# Patient Record
Sex: MALE | Race: WHITE | NOT HISPANIC OR LATINO | Employment: OTHER | URBAN - METROPOLITAN AREA
[De-identification: names, ages, dates, MRNs, and addresses within clinical notes are randomized per-mention and may not be internally consistent; named-entity substitution may affect disease eponyms.]

---

## 2017-02-13 ENCOUNTER — GENERIC CONVERSION - ENCOUNTER (OUTPATIENT)
Dept: OTHER | Facility: OTHER | Age: 67
End: 2017-02-13

## 2017-03-24 ENCOUNTER — LAB CONVERSION - ENCOUNTER (OUTPATIENT)
Dept: OTHER | Facility: OTHER | Age: 67
End: 2017-03-24

## 2017-03-24 LAB
ALBUMIN SERPL BCP-MCNC: 3.9 G/DL (ref 3.6–5.1)
ALP SERPL-CCNC: 61 U/L (ref 40–115)
AST SERPL W P-5'-P-CCNC: 20 U/L (ref 10–35)
BASOPHILS # BLD AUTO: 0.3 10*3/UL
BASOPHILS # BLD AUTO: 12 10*3/UL (ref 0–200)
BILIRUB SERPL-MCNC: 0.7 MG/DL (ref 0.2–1.2)
BUN SERPL-MCNC: 14 MG/DL (ref 7–25)
CALCIUM SERPL-MCNC: 9.3 MG/DL (ref 8.6–10.3)
CHLORIDE SERPL-SCNC: 107 MMOL/L (ref 98–110)
CHOLEST SERPL-MCNC: 191 MG/DL (ref 125–200)
CHOLEST/HDLC SERPL: 2.8 {RATIO}
CO2 SERPL-SCNC: 26 MMOL/L (ref 20–31)
CREAT SERPL-MCNC: 0.95 MG/DL (ref 0.7–1.25)
DEPRECATED RDW RBC AUTO: 13.6 FL (ref 11–15)
EGFR AFRICAN AMERICAN (HISTORICAL): 97
EGFR-AMERICAN CALC (HISTORICAL): 84
EOSINOPHIL # BLD AUTO: 124 10*3/UL (ref 15–500)
EOSINOPHIL NFR BLD AUTO: 3.1 %
GLUCOSE SERPL-MCNC: 84 MG/DL (ref 65–99)
HCT VFR BLD AUTO: 44.4 % (ref 38.5–50)
HDLC SERPL-MCNC: 69 MG/DL
HGB BLD-MCNC: 14.6 G/DL (ref 13.2–17.1)
LDLC SERPL CALC-MCNC: 108 MG/DL
LYMPHOCYTES # BLD AUTO: 1712 10*3/UL (ref 850–3900)
LYMPHOCYTES NFR BLD AUTO: 42.8 %
MCH RBC QN AUTO: 30 PG (ref 27–33)
MCHC RBC AUTO-ENTMCNC: 33 G/DL (ref 32–0.36)
MCV RBC AUTO: 90.9 FL (ref 80–100)
MONOCYTES # BLD AUTO: 468 10*3/UL (ref 200–950)
MONOCYTES NFR BLD AUTO: 11.7 %
NEUTROPHILS # BLD AUTO: 1684 10*3/UL (ref 1500–7800)
NEUTS SEG NFR BLD AUTO: 42.1 %
PLATELET # BLD AUTO: 191 10*3/UL (ref 140–400)
PMV BLD AUTO: 9.2 FL (ref 7.5–12.5)
POTASSIUM SERPL-SCNC: 5.1 MMOL/L (ref 3.5–5.3)
RBC # BLD AUTO: 4.88 10*6/UL (ref 4.2–5.8)
SODIUM SERPL-SCNC: 141 MMOL/L (ref 135–146)
TOTAL PROTEIN (HISTORICAL): 6.4 (ref 6.1–8.1)
TRIGL SERPL-MCNC: 68 MG/DL
VLDLC SERPL CALC-MCNC: 14 MG/DL
WBC # BLD AUTO: 4 10*3/UL (ref 3.8–10.8)

## 2017-04-12 ENCOUNTER — GENERIC CONVERSION - ENCOUNTER (OUTPATIENT)
Dept: OTHER | Facility: OTHER | Age: 67
End: 2017-04-12

## 2017-04-28 ENCOUNTER — GENERIC CONVERSION - ENCOUNTER (OUTPATIENT)
Dept: OTHER | Facility: OTHER | Age: 67
End: 2017-04-28

## 2017-07-11 ENCOUNTER — GENERIC CONVERSION - ENCOUNTER (OUTPATIENT)
Dept: OTHER | Facility: OTHER | Age: 67
End: 2017-07-11

## 2017-07-21 ENCOUNTER — GENERIC CONVERSION - ENCOUNTER (OUTPATIENT)
Dept: OTHER | Facility: OTHER | Age: 67
End: 2017-07-21

## 2017-11-28 DIAGNOSIS — Z12.5 ENCOUNTER FOR SCREENING FOR MALIGNANT NEOPLASM OF PROSTATE: ICD-10-CM

## 2017-12-12 ENCOUNTER — ALLSCRIPTS OFFICE VISIT (OUTPATIENT)
Dept: OTHER | Facility: OTHER | Age: 67
End: 2017-12-12

## 2018-01-23 VITALS
HEART RATE: 89 BPM | OXYGEN SATURATION: 96 % | HEIGHT: 69 IN | WEIGHT: 186.38 LBS | TEMPERATURE: 98.4 F | BODY MASS INDEX: 27.6 KG/M2

## 2018-01-23 NOTE — PROGRESS NOTES
Assessment    1  Encounter for preventive health examination (V70 0) (Z00 00)   2  Anxiety (300 00) (F41 9)   3  Generalized osteoarthritis (715 00) (M15 9)   4  Acoustic neuroma (225 1) (D33 3)    Plan  Advanced care planning/counseling discussion    · We recommend that you create an advance directive ; Status:Complete;   Done:  68TXQ3994  Anxiety    · ALPRAZolam 0 5 MG Oral Tablet; take 1 tablet daily as needed  Screening for depression    · *VB-Depression Screening; Status:Complete;   Done: 70JNK7942 12:00AM  Screening for genitourinary condition    · *VB - Urinary Incontinence Screen (Dx Z13 89 Screen for UI); Status:Complete;   Done:  96UDR0367 12:00AM  Screening for neurological condition    · *VB - Fall Risk Assessment  (Dx Z13 89 Screen for Neurologic Disorder);  Status:Complete;   Done: 14FBV2787 12:00AM    Discussion/Summary  health maintenance visit Impression: healthy adult male  eats an adequate diet Currently, he has an adequate exercise regimen  Prostate cancer screening: PSA was ordered and will get w labs at [facility] in march  Testicular cancer screening: monthly self testicular exam was advised  Colorectal cancer screening: colorectal cancer screening is current  The patient declines immunizations  Advice and education were given regarding weight bearing exercise and weight loss  Patient discussion: discussed with the patient  Pt gets labs in spring thru the [facility]  Exercise daily  Refill alprazolam, pt tries not to take regularly  Recommended prevnar, pt will consider  Rto 6 months  Possible side effects of new medications were reviewed with the patient/guardian today  The treatment plan was reviewed with the patient/guardian  The patient/guardian understands and agrees with the treatment plan         Self Referrals: No      Chief Complaint  Pt presents today for a health maintenance visit  No new medications, pt is requesting alprazolam refill  No new allergies reported   Pt states that he underwent an outpatient radiation procedure on August 31st-does not appear to be in chart  Performed by Dr Arpit Barrett  Pt has not had a pneumo vacc as of today  No issues with sleep or appetite  No falls in the past year  History of Present Illness  HM, Adult Male: The patient is being seen for a health maintenance evaluation  The last health maintenance visit was 1 yr year(s) ago  General Health: The patient's health since the last visit is described as good  He has regular dental visits  He denies vision problems  He has hearing loss  Immunizations status: not up to date   pt had procedure for AN  Lifestyle:  He consumes a diverse and healthy diet  He does not have any weight concerns  He exercises regularly  He does not use tobacco  He denies alcohol use  He denies drug use  Reproductive health:  the patient is sexually active  birth control is not being practiced  He complains of erectile dysfunction  Screening: cancer screening reviewed and current  metabolic screening reviewed and current  risk screening reviewed and current  HPI: Pt had gamma knife for acoustic neuroma, thinks sxs have lessened  No headache or vision change  No cp  Walks for exercise  Mri anette 9 months      Review of Systems    Constitutional: no fever and not feeling tired  Eyes: No complaints of eye pain, no red eyes, no discharge from eyes, no itchy eyes  ENT: no complaints of earache, no hearing loss, no nosebleeds, no nasal discharge, no sore throat, no hoarseness  Cardiovascular: No complaints of slow heart rate, no fast heart rate, no chest pain, no palpitations, no leg claudication, no lower extremity  Respiratory: shortness of breath during exertion  Gastrointestinal: No complaints of abdominal pain, no constipation, no nausea or vomiting, no diarrhea or bloody stools  Genitourinary: No complaints of dysuria, no incontinence, no hesitancy, no nocturia, no genital lesion, no testicular pain  Musculoskeletal: arthralgias and myalgias  Integumentary: No complaints of skin rash or skin lesions, no itching, no skin wound, no dry skin  Neurological: no difficulty walking  Psychiatric: Is not suicidal, no sleep disturbances, no anxiety or depression, no change in personality, no emotional problems  Endocrine: No complaints of proptosis, no hot flashes, no muscle weakness, no erectile dysfunction, no deepening of the voice, no feelings of weakness  Hematologic/Lymphatic: No complaints of swollen glands, no swollen glands in the neck, does not bleed easily, no easy bruising  Active Problems    1  Advanced care planning/counseling discussion (V65 49) (Z71 89)   2  Anxiety (300 00) (F41 9)   3  Elevated prostate specific antigen (PSA) (790 93) (R97 20)   4  Encounter for special screening examination for neoplasm of prostate (V76 44) (Z12 5)   5  Generalized osteoarthritis (715 00) (M15 9)   6  Hearing loss (389 9) (H91 90)   7  Hyperlipidemia (272 4) (E78 5)   8  Need for influenza vaccination (V04 81) (Z23)    Surgical History    · History of Ankle Surgery   · History of Oral Surgery Tooth Extraction [place] Tooth   · History of Wrist Surgery    Family History  Mother    · Family history of Alzheimer's dementia    Social History    · Denied: Alcohol use   · Always uses seat belt   · Caffeine use (V49 89) (F15 90)   · Denied: Drug use (305 90) (F19 90)   · Never a smoker   · Nonsmoker (V49 89) (Z78 9)   · Denied: Tobacco use (305 1) (Z72 0)    Current Meds   1  Aleve 220 MG Oral Capsule; Therapy: 20OUR6553 to Recorded   2  ALPRAZolam 0 5 MG Oral Tablet; one po daily prn; Therapy: 71Gku9690 to (Last Rx:65Djx7598) Ordered   3  Multi-Vitamin Oral Tablet; TAKE 1 TABLET DAILY; Therapy: 71ZLL5146 to (Evaluate:09Jan2015); Last Rx:38Itp9956 Ordered   4  Simvastatin 10 MG Oral Tablet; Take 1 tablet daily;    Therapy: 11NOH5921 to (Iqra Amos)  Requested for: 81Woe2800; Last   Rx:71Yps6251 Ordered   5  Vitamin B-Complex Oral Tablet; TAKE 1 TABLET DAILY; Therapy: 95WHG4399 to (Evaluate:09Jan2015)  Requested for: 66GPO6176; Last   Rx:75Cro1309 Ordered   6  Zyclara Pump 3 75 % External Cream;   Therapy: 95BZP9231 to Recorded    Allergies    1  Neomycin Sulfate TABS   2  Polymyxin B Sulfate SOLR    Vitals   Recorded: 12Gfy1830 12:52PM   Temperature 98 4 F   Heart Rate 89   Height 5 ft 9 in   Weight 186 lb 6 oz   BMI Calculated 27 52   BSA Calculated 2   O2 Saturation 96     Physical Exam    Constitutional   General appearance: No acute distress, well appearing and well nourished  no distress  Head and Face   Head and face: Normal     Palpation of the face and sinuses: No sinus tenderness  Eyes   Conjunctiva and lids: No erythema, swelling or discharge  Pupils and irises: Equal, round, reactive to light  Ophthalmoscopic examination: Normal fundi and optic discs  Ears, Nose, Mouth, and Throat   External inspection of ears and nose: Normal     Otoscopic examination: Tympanic membranes translucent with normal light reflex  Canals patent without erythema  Hearing: Normal     Nasal mucosa, septum, and turbinates: Normal without edema or erythema  Lips, teeth, and gums: Normal, good dentition  Oropharynx: Normal with no erythema, edema, exudate or lesions  Neck   Neck: Supple, symmetric, trachea midline, no masses  Thyroid: Normal, no thyromegaly  Pulmonary   Respiratory effort: No increased work of breathing or signs of respiratory distress  Percussion of chest: Normal     Palpation of chest: Normal     Auscultation of lungs: Clear to auscultation  Cardiovascular   Palpation of heart: Normal PMI, no thrills  Auscultation of heart: Normal rate and rhythm, normal S1 and S2, no murmurs  Carotid pulses: 2+ bilaterally  Abdominal aorta: Normal     Femoral pulses: 2+ bilaterally  Pedal pulses: 2+ bilaterally      Peripheral vascular exam: Normal     Examination of extremities for edema and/or varicosities: Normal     Abdomen   Abdomen: Non-tender, no masses  Liver and spleen: No hepatomegaly or splenomegaly  Examination for hernias: No hernias appreciated  Anus, perineum, and rectum: Normal sphincter tone, no masses, no prolapse  Stool sample for occult blood: Negative  Genitourinary   Scrotal contents: Normal testes, no masses  Penis: Normal, no lesions  Lymphatic   Palpation of lymph nodes in neck: No lymphadenopathy  Palpation of lymph nodes in axillae: No lymphadenopathy  Palpation of lymph nodes in groin: No lymphadenopathy  Palpation of lymph nodes in other areas: No lymphadenopathy  Musculoskeletal   Gait and station: Normal     Inspection/palpation of digits and nails: Normal without clubbing or cyanosis  Inspection/palpation of joints, bones, and muscles: Normal     Range of motion: Normal     Stability: Normal     Muscle strength/tone: Normal     Skin   Skin and subcutaneous tissue: Normal without rashes or lesions  Palpation of skin and subcutaneous tissue: Normal turgor  Neurologic   Cranial nerves: Cranial nerves 2-12 intact  Cortical function: Normal mental status  Reflexes: 2+ and symmetric  Sensation: No sensory loss  Coordination: Normal finger to nose and heel to shin  Psychiatric   Judgment and insight: Normal     Orientation to person, place and time: Normal     Recent and remote memory: Intact      Mood and affect: Normal        Results/Data  *VB-Depression Screening 68Qqn7929 12:00AM [employer]     Test Name Result Flag Reference   Depression Scale Result      Depression Screen - Negative For Symptoms     *VB - Fall Risk Assessment  (Dx Z13 89 Screen for Neurologic Disorder) 03Owx4275 12:00AM [employer]     Test Name Result Flag Reference   Falls Risk      No falls in the past year     *VB - Urinary Incontinence Screen (Dx Z13 89 Screen for UI) 05QDJ3992 12:00AM [employer] Test Name Result Flag Reference   Urinary Incontinence Assessment 29VNR9923         Future Appointments    Date/Time Provider Specialty Site   06/19/2018 01:15 PM Pati Bains DO Internal Medicine [facility] INTERNAL MEDICINE [facility]     Signatures   Electronically signed by : Umer Dougherty DO; Dec 12 2017  8:41PM EST                       (Author)

## 2018-01-24 NOTE — PROGRESS NOTES
Assessment   1  Advanced care planning/counseling discussion (V65 49) (Z71 89)  2  Never a smoker  3  Encounter for preventive health examination (V70 0) (Z00 00)    Plan  Anxiety    · Start: ALPRAZolam 0 5 MG Oral Tablet; one po daily prn   · *VB - PHQ-9 Tool; Status:Complete;   Done: 57HTK2873 08:18AM   · *VB-Depression Screening; Status:Complete;   Done: 66EKU4986 08:17AM  Anxiety, Generalized osteoarthritis    · Follow-up visit in 6 months Evaluation and Treatment  Follow-up  Status: Hold For -  Scheduling  Requested for: 95Kki4338  Health Maintenance    · *VB - Fall Risk Assessment  (Dx Z13 89 Screen for Neurologic Disorder);  Status:Complete;   Done: 76ZUZ4315 08:17AM   · *VB - Urinary Incontinence Screen (Dx Z13 89 Screen for UI); Status:Complete;   Done:  06MTR8656 08:17AM    Discussion/Summary  Impression:  health maintenance visit1  ,  healthy adult male1  1   Currently, he  eats an adequate diet1  and  has an adequate exercise regimen1  1   Prostate cancer screening:  prostate cancer screening is current1  1   Testicular cancer screening:  monthly self testicular exam was advised1  1   Colorectal cancer screening:  colorectal cancer screening is current1  1   The  immunizations are up to date1  and  pt only agrees to annual flu shot and that is up to date1  1   Advice and education were given regarding  weight bearing exercise1  ,  calcium supplements1  and  vitamin D supplements1  1   Encouraged exercise and even silver sneakers program  Pt deferred prevnar and zostavax info  Increase water intake  Offered urology f/u and gave sample of viagra  1    The patient has the current Goals:1  Decrease anxiety1  The patent has the current Barriers:1  Lack of a hobby and outside interests   gets down at times but brandan with it and is trying to find other interests1    Patient is able to Self-Care1    Possible side effects of new medications were reviewed with the patient/guardian today1  The treatment plan was reviewed with the patient/guardian  The patient/guardian understands and agrees with the treatment plan1      Self Referrals: No       1 Amended By: Governor Fisher; Dec 29 2016 10:20 AM EST    Chief Complaint  pt is being seen today for an annual wellness visit  pt has no complaints or concerns  pt states he would like to talk about the pneumovax  pt was given five wishes form  pt would/is due for refill on xanax  Advance Directives  Advance Directive [facility]:   NO - Patient does not have an advance health care directive  given packet during appt  History of Present Illness  HM, Adult Male: The patient is being seen for a  health maintenance1  evaluation1   The last health maintenance visit was1  1 year year(s) ago1   General Health: The patient's health since the last visit is described as1  good1   He does not have regular dental visits1   He denies vision problems1   He denies hearing loss1   Immunizations status:1  not up to date1    Gets annual flu shot,deferred others1   Lifestyle:1   He consumes a diverse and healthy diet1   He does not have any weight concerns1   He does not exercise regularly1   He does not use tobacco1   He denies alcohol use1   He denies drug use1   Reproductive health:1   The patient is not sexually active1   Birth control is not being practiced1   He complains of erectile dysfunction1   Pt expresses frustration that he and his wife are no longer sexually active despite his desire for such1   Screening: Cancer screening reviewed and current1   Metabolic screening reviewed and current1   Risk screening reviewed and current1   HPI: Pt doing ok  No new sxs  He is looking for a hobby or something to occupy time dureing the day other than TV   He does walk on occasion but not regularly1        1 Amended By: Governor Fisher; Dec 29 2016 10:12 AM EST    Review of Systems    Constitutional:1  No fever or chills, feels well, no tiredness, no recent weight gain or weight loss1   Eyes:1  new eyeglasses1 , but No complaints of eye pain, no red eyes, no discharge from eyes, no itchy eyes1  and no eyesight problems1   ENT:1  no complaints of earache, no hearing loss, no nosebleeds, no nasal discharge, no sore throat, no hoarseness1   Cardiovascular: 1  No complaints of slow heart rate, no fast heart rate, no chest pain, no palpitations, no leg claudication, no lower extremity1   Respiratory:1  No complaints of shortness of breath, no wheezing, no cough, no SOB on exertion, no orthopnea or PND1   Gastrointestinal:1  No complaints of abdominal pain, no constipation, no nausea or vomiting, no diarrhea or bloody stools1   Genitourinary:1  No complaints of dysuria, no incontinence, no hesitancy, no nocturia, no genital lesion, no testicular pain1   Musculoskeletal:1  arthralgias1   Integumentary:1  No complaints of skin rash or skin lesions, no itching, no skin wound, no dry skin1   Neurological:1  No compliants of headache, no confusion, no convulsions, no numbness or tingling, no dizziness or fainting, no limb weakness, no difficulty walking1   Psychiatric:1  anxiety1   Endocrine:1  No complaints of proptosis, no hot flashes, no muscle weakness, no erectile dysfunction, no deepening of the voice, no feelings of weakness1   Hematologic/Lymphatic:1  No complaints of swollen glands, no swollen glands in the neck, does not bleed easily, no easy bruising1   Over the past 2 weeks, how often have you been bothered by the following problems? 1 ) Little interest or pleasure in doing things? 1  Several days1     2 ) Feeling down, depressed or hopeless? 1  Several days1     3 ) Trouble falling asleep or sleeping too much? 1  Several days1     4 ) Feeling tired or having little energy? 1  Several days1     5 ) Poor appetite or overeating? 1  Not at all1     6 ) Feeling bad about yourself, or that you are a failure, or have let yourself or your family down? 1  Several days1     7 ) Trouble concentrating on things, such as reading a newspaper or watching television? 1  Not at all1     8 ) Moving or speaking so slowly that other people could have noticed, or the opposite, moving or speaking faster than usual?1  Not at all1   severity of depression is mild1    How difficult have these problems made it for you to do your work, take care of things at home, or get along with people? 1  Not at all1   Score 51        1 Amended By: Governor Fisher; Dec 29 2016 10:13 AM EST    Active Problems   1  Advanced care planning/counseling discussion (V65 49) (Z71 89)  2  Anxiety (300 00) (F41 9)  3  Elevated prostate specific antigen (PSA) (790 93) (R97 20)  4  Generalized osteoarthritis (715 00) (M15 9)  5  Hyperlipidemia (272 4) (E78 5)    Past Medical History    · History of Acute sinusitis (461 9) (J01 90)   · History of Allergic rhinitis (477 9) (J30 9)   · History of Colon cancer screening (V76 51) (Z12 11)   · History of Cough (786 2) (R05)   · History of Feeling tired (780 79) (R53 83)   · History of Nasal congestion (478 19) (R09 81)   · History of Need for influenza vaccination (V04 81) (Z23)   · History of Need for influenza vaccination (V04 81) (Z23)   · History of Pain (780 96) (R52)    Surgical History    · History of Ankle Surgery   · History of Oral Surgery Tooth Extraction [place] Tooth   · History of Wrist Surgery    Family History  Mother    · Family history of Alzheimer's dementia    Social History    · Denied: Alcohol use   · Always uses seat belt   · Caffeine use (V49 89) (F15 90)   · Denied: Drug use (305 90) (F19 90)   · Never a smoker   · Nonsmoker (V49 89) (Z78 9)   · Denied: Tobacco use (305 1) (Z72 0)    Current Meds  1  Aleve 220 MG Oral Capsule; Therapy: 81BMN7827 to Recorded  2  ALPRAZolam 0 5 MG Oral Tablet; Take 1 daily; Therapy: 08COP0083 to (Evaluate:11Kte7837); Last Rx:67Zgm5658 Ordered  3  Multi-Vitamin Oral Tablet; TAKE 1 TABLET DAILY;    Therapy: 57IBN3110 to (RJTNDSTB:57OWN5050); Last Rx:96Cam7585 Ordered  4  Simvastatin 10 MG Oral Tablet; Take 1 tablet daily; Therapy: 89OOZ0402 to (Evaluate:03Aug2017)  Requested for: 54Gap1245; Last   Rx:06Vkh2952 Ordered  5  Vitamin B-Complex Oral Tablet; TAKE 1 TABLET DAILY; Therapy: 96FJA2959 to (Evaluate:09Jan2015)  Requested for: 83VQF2099; Last   Rx:57Rdj4643 Ordered    Allergies   1  Neomycin Sulfate TABS  2  Polymyxin B Sulfate SOLR    Vitals   Recorded: 76Aii3714 08:22AM Recorded: 18Svo6156 08:08AM   Temperature  28 5 F   Systolic 910    Diastolic 78    Height  5 ft 9 in   Weight  187 lb 2 08 oz   BMI Calculated  27 63   BSA Calculated  2     Physical Exam    Constitutional1    General appearance: No acute distress, well appearing and well nourished  1  Anxious at times1   Head and Face1    Head and face: Normal 1    Palpation of the face and sinuses: No sinus tenderness  1    Eyes1    Conjunctiva and lids: No erythema, swelling or discharge  1    Pupils and irises: Equal, round, reactive to light  1    Ophthalmoscopic examination: Normal fundi and optic discs  1    Ears, Nose, Mouth, and Throat1    External inspection of ears and nose: Normal 1    Otoscopic examination: Tympanic membranes translucent with normal light reflex  Canals patent without erythema  1    Hearing: Normal 1    Nasal mucosa, septum, and turbinates: Normal without edema or erythema  1    Lips, teeth, and gums: Normal, good dentition  1    Oropharynx: Normal with no erythema, edema, exudate or lesions  1    Neck1    Neck: Supple, symmetric, trachea midline, no masses  1    Thyroid: Normal, no thyromegaly  1    Pulmonary1    Respiratory effort: No increased work of breathing or signs of respiratory distress  1    Percussion of chest: Normal 1    Palpation of chest: Normal 1    Auscultation of lungs: Clear to auscultation  1    Cardiovascular1    Palpation of heart: Normal PMI, no thrills  1    Auscultation of heart: Normal rate and rhythm, normal S1 and S2, no murmurs  1    Carotid pulses: 2+ bilaterally  1    Abdominal aorta: Normal 1    Femoral pulses: 2+ bilaterally  1    Pedal pulses: 2+ bilaterally  1    Peripheral vascular exam: Normal 1    Examination of extremities for edema and/or varicosities: Normal 1    Abdomen1    Abdomen: Non-tender, no masses  1    Liver and spleen: No hepatomegaly or splenomegaly  1    Examination for hernias: No hernias appreciated  1    Anus, perineum, and rectum: Normal sphincter tone, no masses, no prolapse  1    Stool sample for occult blood: Negative  1    Lymphatic1    Palpation of lymph nodes in neck: No lymphadenopathy  1    Palpation of lymph nodes in axillae: No lymphadenopathy  1    Palpation of lymph nodes in groin: No lymphadenopathy  1    Palpation of lymph nodes in other areas: No lymphadenopathy  1    Musculoskeletal1    Gait and station: Normal 1    Inspection/palpation of digits and nails: Normal without clubbing or cyanosis  1    Inspection/palpation of joints, bones, and muscles: Normal 1    Range of motion: Normal 1    Stability: Normal 1    Muscle strength/tone: Normal 1    Skin1    Skin and subcutaneous tissue: Normal without rashes or lesions  1    Palpation of skin and subcutaneous tissue: Normal turgor  1    Neurologic1    Cranial nerves: Cranial nerves 2-12 intact  1    Cortical function: Normal mental status  1    Reflexes: 2+ and symmetric  1    Sensation: No sensory loss  1    Coordination: Normal finger to nose and heel to shin  1    Psychiatric1    Judgment and insight: Normal 1    Orientation to person, place and time: Normal 1    Recent and remote memory: Intact  1    Mood and affect: Normal 1        1 Amended By: Julia Sylvester; Dec 29 2016 10:15 AM EST    Results/Data  Falls Risk Assessment (Dx Z13 89 Screen for Neurologic Disorder) 40Dmr9976 08:19AM User, [facility]     Test Name Result Flag Reference   Falls Risk      No falls in the past year     *VB - PHQ-9 Tool 11HVR5389 08:18AM Julia Sylvester     Test Name Result Flag Reference   PHQ-9 Adult Depression Score 14     PHQ-9 Adult Depression Screening Positive       *VB - Fall Risk Assessment  (Dx Z13 89 Screen for Neurologic Disorder) 51Wze3976 08:17AM Ashley Jose Rafael     Test Name Result Flag Reference   Falls Risk      No falls in the past year     *VB - Urinary Incontinence Screen (Dx Z13 89 Screen for UI) 19Ytx5165 08:17AM Ashley Figueroao     Test Name Result Flag Reference   Urinary Incontinence Assessment 23TPE3863       *VB-Depression Screening 27GZC6270 08:17AM Ashley [facility]     Test Name Result Flag Reference   Depression Scale Result      Depression Screen - Positive Findings       Future Appointments    Date/Time Provider Specialty Site   06/27/2017 08:30 AM Ashley Mantilla DO Internal Medicine [address]     Signatures   Electronically signed by : Mary Whittaker DO; Dec 29 2016 10:20AM EST                       (Author)

## 2018-03-17 LAB
ALP SERPL-CCNC: 64 U/L (ref 46–116)
AST SERPL W P-5'-P-CCNC: 27 U/L (ref 5–45)
BILIRUB SERPL-MCNC: 0.7 MG/DL
BUN SERPL-MCNC: 14 MG/DL (ref 5–25)
CHOLEST SERPL-MCNC: 220 MG/DL (ref 50–200)
CREAT SERPL-MCNC: 0.84 MG/DL (ref 0.6–1.3)
GLUCOSE SERPL-MCNC: 85 MG/DL
HCT VFR BLD AUTO: 45.3 % (ref 36.5–49.3)
HDLC SERPL-MCNC: 63 MG/DL (ref 40–60)
HGB BLD-MCNC: 16.1 G/DL (ref 12–17)
LDLC SERPL DIRECT ASSAY-MCNC: 132 MG/DL
LDLC/HDLC SERPL: 3.5 {RATIO}
NEUTROPHILS # BLD AUTO: 2.29 THOUSANDS/ΜL (ref 1.85–7.62)
PLATELET # BLD AUTO: 208 THOUSANDS/UL (ref 149–390)
POTASSIUM SERPL-SCNC: 4.5 MMOL/L (ref 3.5–5.3)
SODIUM SERPL-SCNC: 141 MMOL/L (ref 136–145)
TRIGL SERPL-MCNC: 130 MG/DL (ref ?–150)
WBC # BLD AUTO: 4.7 10*3/ML (ref 3.3–10)

## 2018-03-26 ENCOUNTER — TELEPHONE (OUTPATIENT)
Dept: INTERNAL MEDICINE CLINIC | Facility: CLINIC | Age: 68
End: 2018-03-26

## 2018-03-26 LAB — PSA (HISTORICAL): 4.8 NG/ML

## 2018-03-26 NOTE — TELEPHONE ENCOUNTER
Spoke with patients wife regarding recent [facility] labs  Izora Loop states Gabrielle Patiño has appt next week with Dr Sujata Rod (urology)  Cholesterol elevated  Low fat diet/exercise  Recheck 6 months

## 2018-04-27 ENCOUNTER — TRANSCRIBE ORDERS (OUTPATIENT)
Dept: LAB | Facility: MEDICAL CENTER | Age: 68
End: 2018-04-27

## 2018-04-27 ENCOUNTER — LAB (OUTPATIENT)
Dept: LAB | Facility: MEDICAL CENTER | Age: 68
End: 2018-04-27
Payer: COMMERCIAL

## 2018-04-27 DIAGNOSIS — R97.20 ELEVATED PROSTATE SPECIFIC ANTIGEN (PSA): ICD-10-CM

## 2018-04-27 DIAGNOSIS — R97.20 ELEVATED PROSTATE SPECIFIC ANTIGEN (PSA): Primary | ICD-10-CM

## 2018-04-27 DIAGNOSIS — E29.1 TESTICULAR FAILURE: ICD-10-CM

## 2018-04-27 PROCEDURE — 36415 COLL VENOUS BLD VENIPUNCTURE: CPT

## 2018-04-27 PROCEDURE — 84153 ASSAY OF PSA TOTAL: CPT

## 2018-04-27 PROCEDURE — 84402 ASSAY OF FREE TESTOSTERONE: CPT

## 2018-04-27 PROCEDURE — 84154 ASSAY OF PSA FREE: CPT

## 2018-04-27 PROCEDURE — 84403 ASSAY OF TOTAL TESTOSTERONE: CPT

## 2018-04-28 LAB
PSA FREE MFR SERPL: 19.5 %
PSA FREE SERPL-MCNC: 1.07 NG/ML
PSA SERPL-MCNC: 5.5 NG/ML (ref 0–4)
TESTOST FREE SERPL-MCNC: 9.9 PG/ML (ref 6.6–18.1)
TESTOST SERPL-MCNC: 605 NG/DL (ref 264–916)

## 2018-06-19 PROBLEM — H91.90 HEARING LOSS: Status: ACTIVE | Noted: 2017-02-01

## 2018-06-19 PROBLEM — D33.3 ACOUSTIC NEUROMA (HCC): Status: ACTIVE | Noted: 2017-12-12

## 2018-10-03 ENCOUNTER — CLINICAL SUPPORT (OUTPATIENT)
Dept: INTERNAL MEDICINE CLINIC | Facility: CLINIC | Age: 68
End: 2018-10-03
Payer: COMMERCIAL

## 2018-10-03 DIAGNOSIS — Z23 NEED FOR INFLUENZA VACCINATION: Primary | ICD-10-CM

## 2018-10-03 PROCEDURE — 90662 IIV NO PRSV INCREASED AG IM: CPT

## 2018-10-03 PROCEDURE — 90471 IMMUNIZATION ADMIN: CPT

## 2018-10-12 DIAGNOSIS — E78.5 HYPERLIPIDEMIA, UNSPECIFIED HYPERLIPIDEMIA TYPE: Primary | ICD-10-CM

## 2018-10-12 RX ORDER — SIMVASTATIN 10 MG
10 TABLET ORAL DAILY
Qty: 90 TABLET | Refills: 3 | Status: SHIPPED | OUTPATIENT
Start: 2018-10-12 | End: 2019-03-27 | Stop reason: SDUPTHER

## 2018-10-24 DIAGNOSIS — J06.9 UPPER RESPIRATORY TRACT INFECTION, UNSPECIFIED TYPE: Primary | ICD-10-CM

## 2018-10-24 RX ORDER — AZITHROMYCIN 250 MG/1
TABLET, FILM COATED ORAL
Qty: 6 TABLET | Refills: 0 | Status: SHIPPED | OUTPATIENT
Start: 2018-10-24 | End: 2018-10-28

## 2018-10-24 RX ORDER — BENZONATATE 100 MG/1
100 CAPSULE ORAL 3 TIMES DAILY PRN
Qty: 21 CAPSULE | Refills: 0 | Status: SHIPPED | OUTPATIENT
Start: 2018-10-24 | End: 2018-12-18 | Stop reason: ALTCHOICE

## 2018-10-24 NOTE — TELEPHONE ENCOUNTER
Pts wife states that the pt is c/o deep cough, sore throat, and lots of phlegm since Monday  Pt has been taking musinex dm max  Allergic to polymyxin and neomycin  Pharmacy Standard Call

## 2018-12-18 ENCOUNTER — OFFICE VISIT (OUTPATIENT)
Dept: INTERNAL MEDICINE CLINIC | Facility: CLINIC | Age: 68
End: 2018-12-18
Payer: COMMERCIAL

## 2018-12-18 VITALS
WEIGHT: 188.13 LBS | BODY MASS INDEX: 27.86 KG/M2 | HEART RATE: 87 BPM | HEIGHT: 69 IN | DIASTOLIC BLOOD PRESSURE: 74 MMHG | TEMPERATURE: 97.2 F | SYSTOLIC BLOOD PRESSURE: 140 MMHG | OXYGEN SATURATION: 97 %

## 2018-12-18 DIAGNOSIS — F32.9 REACTIVE DEPRESSION: ICD-10-CM

## 2018-12-18 DIAGNOSIS — F41.9 ANXIETY: ICD-10-CM

## 2018-12-18 DIAGNOSIS — D33.3 ACOUSTIC NEUROMA (HCC): ICD-10-CM

## 2018-12-18 DIAGNOSIS — Z00.00 VISIT FOR PREVENTIVE HEALTH EXAMINATION: Primary | ICD-10-CM

## 2018-12-18 PROCEDURE — 1160F RVW MEDS BY RX/DR IN RCRD: CPT | Performed by: INTERNAL MEDICINE

## 2018-12-18 PROCEDURE — 99397 PER PM REEVAL EST PAT 65+ YR: CPT | Performed by: INTERNAL MEDICINE

## 2018-12-18 RX ORDER — SERTRALINE HYDROCHLORIDE 25 MG/1
25 TABLET, FILM COATED ORAL DAILY
Qty: 30 TABLET | Refills: 5 | Status: SHIPPED | OUTPATIENT
Start: 2018-12-18 | End: 2019-01-11 | Stop reason: SDUPTHER

## 2018-12-18 RX ORDER — ALPRAZOLAM 0.5 MG/1
0.5 TABLET ORAL
Qty: 30 TABLET | Refills: 0 | Status: SHIPPED | OUTPATIENT
Start: 2018-12-18 | End: 2019-06-18 | Stop reason: SDUPTHER

## 2018-12-18 RX ORDER — CHOLECALCIFEROL (VITAMIN D3) 125 MCG
1000 CAPSULE ORAL DAILY
COMMUNITY

## 2018-12-18 NOTE — PATIENT INSTRUCTIONS

## 2018-12-18 NOTE — PROGRESS NOTES
Assessment/Plan:         Diagnoses and all orders for this visit:    Visit for preventive health examination    Anxiety  -     ALPRAZolam (XANAX) 0 5 mg tablet; Take 1 tablet (0 5 mg total) by mouth daily at bedtime as needed for anxiety  He takes prn  Encouraged patient to speak with therapist but he is hesitant and encouraged him to talk to his wife about concerns     Acoustic neuroma [facility])  He is followed by neurology at [facility] in August with MRI prior    Reactive depression  Start zoloft 25mg daily  Other orders  -     Cholecalciferol (VITAMIN D3) 2000 units TABS; Take 1,000 Units by mouth daily    Rto 6 months       Patient ID: Shey Jay is a 80 y o  male  HPI   Pt had surgery at [facility] for neuroma and has followup in August  He did see urology and had biopsy and is being watched  He feels depressed for a long time - since he left work and has issues at home with not having relations with his wife  Does walk but not as often  He is not happy often and is sleeping more     The following portions of the patient's history were reviewed and updated as appropriate: No past medical history on file  Past Surgical History:   Procedure Laterality Date    ANKLE SURGERY      WISDOM TOOTH EXTRACTION      WRIST SURGERY       Allergies   Allergen Reactions    Polymyxin B     Neomycin Rash     Social History     Social History    Marital status: /Civil Union     Spouse name: N/A    Number of children: N/A    Years of education: N/A     Occupational History    Not on file  Social History Main Topics    Smoking status: Never Smoker    Smokeless tobacco: Never Used      Comment: NO TOBACCO USE    Alcohol use No    Drug use: No    Sexual activity: Not on file     Other Topics Concern    Not on file     Social History Narrative    ALWAYS USES  SEAT BELT    CAFFEINE USE             Review of Systems   Constitutional: Negative  HENT: Negative  Eyes: Negative  Respiratory: Negative  Cardiovascular: Negative  Gastrointestinal: Negative  Endocrine: Negative  Genitourinary: Negative  Musculoskeletal: Negative  Skin: Negative  Neurological: Negative  Hematological: Negative  Psychiatric/Behavioral: Positive for sleep disturbance  The patient is nervous/anxious  No past medical history on file  Past Surgical History:   Procedure Laterality Date    ANKLE SURGERY      WISDOM TOOTH EXTRACTION      WRIST SURGERY       Social History     Social History    Marital status: /Civil Union     Spouse name: N/A    Number of children: N/A    Years of education: N/A     Occupational History    Not on file  Social History Main Topics    Smoking status: Never Smoker    Smokeless tobacco: Never Used      Comment: NO TOBACCO USE    Alcohol use No    Drug use: No    Sexual activity: Not on file     Other Topics Concern    Not on file     Social History Narrative    ALWAYS USES  SEAT BELT    CAFFEINE USE         Allergies   Allergen Reactions    Polymyxin B     Neomycin Rash       /74   Pulse 87   Temp (!) 97 2 °F (36 2 °C) (Tympanic)   Ht 5' 9" (1 753 m)   Wt 85 3 kg (188 lb 2 oz)   SpO2 97%   BMI 27 78 kg/m²          Physical Exam   Constitutional: He is oriented to person, place, and time  He appears well-developed and well-nourished  No distress  HENT:   Head: Normocephalic and atraumatic  Right Ear: External ear normal    Left Ear: External ear normal    Nose: Nose normal    Mouth/Throat: Oropharynx is clear and moist  No oropharyngeal exudate  Eyes: Pupils are equal, round, and reactive to light  Conjunctivae and EOM are normal  No scleral icterus  Neck: Normal range of motion  Neck supple  No JVD present  Cardiovascular: Normal rate, normal heart sounds and intact distal pulses  No murmur heard  Pulmonary/Chest: Effort normal and breath sounds normal  No respiratory distress  He has no wheezes  He has no rales  Abdominal: Soft  Bowel sounds are normal    Musculoskeletal: Normal range of motion  He exhibits no edema, tenderness or deformity  Lymphadenopathy:     He has no cervical adenopathy  Neurological: He is alert and oriented to person, place, and time  He has normal reflexes  He displays normal reflexes  No cranial nerve deficit  He exhibits normal muscle tone  Coordination normal    Skin: Skin is warm and dry  He is not diaphoretic  Psychiatric: He has a normal mood and affect  His behavior is normal  Thought content normal    Nursing note and vitals reviewed

## 2019-01-11 DIAGNOSIS — F32.9 REACTIVE DEPRESSION: ICD-10-CM

## 2019-01-11 RX ORDER — SERTRALINE HYDROCHLORIDE 25 MG/1
25 TABLET, FILM COATED ORAL DAILY
Qty: 30 TABLET | Refills: 5 | Status: SHIPPED | OUTPATIENT
Start: 2019-01-11 | End: 2019-06-18 | Stop reason: DRUGHIGH

## 2019-03-27 ENCOUNTER — TELEPHONE (OUTPATIENT)
Dept: INTERNAL MEDICINE CLINIC | Facility: CLINIC | Age: 69
End: 2019-03-27

## 2019-03-27 DIAGNOSIS — E78.5 HYPERLIPIDEMIA, UNSPECIFIED HYPERLIPIDEMIA TYPE: ICD-10-CM

## 2019-03-27 RX ORDER — SIMVASTATIN 20 MG
20 TABLET ORAL DAILY
Qty: 90 TABLET | Refills: 3 | Status: SHIPPED | OUTPATIENT
Start: 2019-03-27 | End: 2020-03-17

## 2019-03-27 NOTE — TELEPHONE ENCOUNTER
Spoke with patient regarding recent [facility] labs  Patient was informed his Cholesterol is elevated and recommended to increase his Simvastatin to 20mg daily

## 2019-05-10 DIAGNOSIS — J02.9 SORE THROAT: Primary | ICD-10-CM

## 2019-05-10 RX ORDER — AZITHROMYCIN 250 MG/1
TABLET, FILM COATED ORAL
Qty: 6 TABLET | Refills: 0 | Status: SHIPPED | OUTPATIENT
Start: 2019-05-10 | End: 2019-05-14

## 2019-06-18 ENCOUNTER — OFFICE VISIT (OUTPATIENT)
Dept: INTERNAL MEDICINE CLINIC | Facility: CLINIC | Age: 69
End: 2019-06-18
Payer: MEDICARE

## 2019-06-18 VITALS
TEMPERATURE: 98.8 F | BODY MASS INDEX: 28.01 KG/M2 | DIASTOLIC BLOOD PRESSURE: 78 MMHG | OXYGEN SATURATION: 97 % | SYSTOLIC BLOOD PRESSURE: 124 MMHG | HEART RATE: 80 BPM | HEIGHT: 69 IN | WEIGHT: 189.13 LBS

## 2019-06-18 DIAGNOSIS — E78.2 MIXED HYPERLIPIDEMIA: ICD-10-CM

## 2019-06-18 DIAGNOSIS — Z00.00 MEDICARE ANNUAL WELLNESS VISIT, INITIAL: Primary | ICD-10-CM

## 2019-06-18 DIAGNOSIS — F32.9 REACTIVE DEPRESSION: ICD-10-CM

## 2019-06-18 DIAGNOSIS — D33.3 ACOUSTIC NEUROMA (HCC): ICD-10-CM

## 2019-06-18 DIAGNOSIS — F41.9 ANXIETY: ICD-10-CM

## 2019-06-18 PROCEDURE — G0402 INITIAL PREVENTIVE EXAM: HCPCS | Performed by: INTERNAL MEDICINE

## 2019-06-18 RX ORDER — SERTRALINE HYDROCHLORIDE 25 MG/1
25 TABLET, FILM COATED ORAL DAILY
Qty: 30 TABLET | Refills: 5 | Status: CANCELLED | OUTPATIENT
Start: 2019-06-18 | End: 2019-11-23

## 2019-06-18 RX ORDER — ALPRAZOLAM 0.5 MG/1
0.5 TABLET ORAL
Qty: 30 TABLET | Refills: 0 | Status: SHIPPED | OUTPATIENT
Start: 2019-06-18 | End: 2019-12-20 | Stop reason: SDUPTHER

## 2019-06-29 DIAGNOSIS — F32.9 REACTIVE DEPRESSION: ICD-10-CM

## 2019-06-29 RX ORDER — SERTRALINE HYDROCHLORIDE 25 MG/1
TABLET, FILM COATED ORAL
Qty: 90 TABLET | Refills: 1 | Status: SHIPPED | OUTPATIENT
Start: 2019-06-29 | End: 2020-08-14 | Stop reason: DRUGHIGH

## 2019-10-21 ENCOUNTER — IMMUNIZATIONS (OUTPATIENT)
Dept: INTERNAL MEDICINE CLINIC | Facility: CLINIC | Age: 69
End: 2019-10-21
Payer: MEDICARE

## 2019-10-21 DIAGNOSIS — Z23 ENCOUNTER FOR IMMUNIZATION: ICD-10-CM

## 2019-10-21 PROCEDURE — 90662 IIV NO PRSV INCREASED AG IM: CPT

## 2019-10-21 PROCEDURE — G0008 ADMIN INFLUENZA VIRUS VAC: HCPCS

## 2019-12-08 DIAGNOSIS — F32.9 REACTIVE DEPRESSION: ICD-10-CM

## 2019-12-09 ENCOUNTER — APPOINTMENT (OUTPATIENT)
Dept: LAB | Facility: CLINIC | Age: 69
End: 2019-12-09
Payer: MEDICARE

## 2019-12-09 DIAGNOSIS — E78.2 MIXED HYPERLIPIDEMIA: ICD-10-CM

## 2019-12-09 LAB
CHOLEST SERPL-MCNC: 222 MG/DL (ref 50–200)
HDLC SERPL-MCNC: 67 MG/DL
LDLC SERPL CALC-MCNC: 127 MG/DL (ref 0–100)
NONHDLC SERPL-MCNC: 155 MG/DL
TRIGL SERPL-MCNC: 141 MG/DL

## 2019-12-09 PROCEDURE — 80061 LIPID PANEL: CPT

## 2019-12-09 PROCEDURE — 36415 COLL VENOUS BLD VENIPUNCTURE: CPT

## 2019-12-20 ENCOUNTER — OFFICE VISIT (OUTPATIENT)
Dept: INTERNAL MEDICINE CLINIC | Facility: CLINIC | Age: 69
End: 2019-12-20
Payer: MEDICARE

## 2019-12-20 VITALS
HEIGHT: 69 IN | SYSTOLIC BLOOD PRESSURE: 122 MMHG | WEIGHT: 180.25 LBS | DIASTOLIC BLOOD PRESSURE: 78 MMHG | TEMPERATURE: 97.4 F | HEART RATE: 86 BPM | OXYGEN SATURATION: 97 % | BODY MASS INDEX: 26.7 KG/M2

## 2019-12-20 DIAGNOSIS — F41.9 ANXIETY: ICD-10-CM

## 2019-12-20 DIAGNOSIS — R97.20 ELEVATED PROSTATE SPECIFIC ANTIGEN (PSA): ICD-10-CM

## 2019-12-20 DIAGNOSIS — F32.9 REACTIVE DEPRESSION: ICD-10-CM

## 2019-12-20 DIAGNOSIS — D33.3 ACOUSTIC NEUROMA (HCC): Primary | ICD-10-CM

## 2019-12-20 PROCEDURE — 99214 OFFICE O/P EST MOD 30 MIN: CPT | Performed by: INTERNAL MEDICINE

## 2019-12-20 RX ORDER — ALPRAZOLAM 0.5 MG/1
0.5 TABLET ORAL
Qty: 30 TABLET | Refills: 0 | Status: SHIPPED | OUTPATIENT
Start: 2019-12-20 | End: 2020-06-25 | Stop reason: SDUPTHER

## 2019-12-20 NOTE — PROGRESS NOTES
Assessment/Plan:         Diagnoses and all orders for this visit:    Acoustic neuroma ([address] )  Pt has annual followup in August He has no new sxs and does have hearing aides which he feels do help    Anxiety  -     ALPRAZolam (XANAX) 0 5 mg tablet; Take 1 tablet (0 5 mg total) by mouth daily at bedtime as needed for anxiety  Pt takes this rarely now and usually if takes once a week that is the most  Being on the SSRI has helped him much more    Reactive depression  Pt continues to feel the sertraline has been beneficial and he feels much better since starting it  Continue same rx  Elevated prostate specific antigen (PSA)  YANN done today Last PSA in March was 4 8 and pt asxs  He has seen Dr Krishan Muller in the past and will followup at some point - had bx last year that was negative per pt      Rto 6 months      Patient ID: Latanya Scruggs is a 77 y o  male  HPI  Pt feels well  No acute issues He continues to feel better since on the sertraline No falls He walks for exercise as able with the weather He rarely takes the alprazolam since on the sertraline daily  He is sleeping well He denies any new issues with his hearing He had a prostate bx last year and was told negative He states Dr Krishan Muller moved to [place] so he has not been back yet  No urinary complaints but would like yann today      Review of Systems   Constitutional: Negative  HENT: Negative  Eyes: Negative  Respiratory: Negative  Cardiovascular: Negative  Genitourinary: Negative  Musculoskeletal: Negative  Skin: Negative  Neurological: Negative  Hematological: Negative  Psychiatric/Behavioral: Negative  History reviewed  No pertinent past medical history    Past Surgical History:   Procedure Laterality Date    ANKLE SURGERY      WISDOM TOOTH EXTRACTION      WRIST SURGERY       Social History     Socioeconomic History    Marital status: /Civil Union     Spouse name: Not on file    Number of children: Not on file    Years of education: Not on file    Highest education level: Not on file   Occupational History    Not on file   Social Needs    Financial resource strain: Not on file    Food insecurity:     Worry: Not on file     Inability: Not on file    Transportation needs:     Medical: Not on file     Non-medical: Not on file   Tobacco Use    Smoking status: Never Smoker    Smokeless tobacco: Never Used    Tobacco comment: NO TOBACCO USE   Substance and Sexual Activity    Alcohol use: No    Drug use: No    Sexual activity: Not on file   Lifestyle    Physical activity:     Days per week: Not on file     Minutes per session: Not on file    Stress: Not on file   Relationships    Social connections:     Talks on phone: Not on file     Gets together: Not on file     Attends Pentecostalism service: Not on file     Active member of club or organization: Not on file     Attends meetings of clubs or organizations: Not on file     Relationship status: Not on file    Intimate partner violence:     Fear of current or ex partner: Not on file     Emotionally abused: Not on file     Physically abused: Not on file     Forced sexual activity: Not on file   Other Topics Concern    Not on file   Social History Narrative    ALWAYS USES  SEAT BELT    CAFFEINE USE     Allergies   Allergen Reactions    Polymyxin B     Neomycin Rash           /78   Pulse 86   Temp (!) 97 4 °F (36 3 °C) (Tympanic)   Ht 5' 9" (1 753 m)   Wt 81 8 kg (180 lb 4 oz)   SpO2 97%   BMI 26 62 kg/m²          Physical Exam   Constitutional: He is oriented to person, place, and time  He appears well-developed and well-nourished  No distress  HENT:   Head: Normocephalic and atraumatic  Mouth/Throat: Oropharynx is clear and moist  No oropharyngeal exudate  Eyes: Pupils are equal, round, and reactive to light  Conjunctivae and EOM are normal  No scleral icterus  Neck: Normal range of motion  Neck supple  No JVD present     Cardiovascular: Normal rate and regular rhythm  No murmur heard  Pulmonary/Chest: Effort normal and breath sounds normal  No respiratory distress  He has no wheezes  He has no rales  Abdominal: Soft  Bowel sounds are normal  He exhibits no distension  There is no tenderness  Musculoskeletal: Normal range of motion  He exhibits no edema  Lymphadenopathy:     He has no cervical adenopathy  Neurological: He is alert and oriented to person, place, and time  He displays normal reflexes  No cranial nerve deficit  He exhibits normal muscle tone  Coordination normal    Skin: Skin is warm and dry  Capillary refill takes less than 2 seconds  He is not diaphoretic  Psychiatric: He has a normal mood and affect  His behavior is normal  Judgment and thought content normal    Nursing note and vitals reviewed    CHRISSY-extranal nonengorged hemorrhoids  Smooth nontender prostate

## 2020-03-17 DIAGNOSIS — E78.5 HYPERLIPIDEMIA, UNSPECIFIED HYPERLIPIDEMIA TYPE: ICD-10-CM

## 2020-03-17 RX ORDER — SIMVASTATIN 20 MG
TABLET ORAL
Qty: 90 TABLET | Refills: 3 | Status: SHIPPED | OUTPATIENT
Start: 2020-03-17 | End: 2021-03-04

## 2020-05-30 DIAGNOSIS — F32.9 REACTIVE DEPRESSION: ICD-10-CM

## 2020-06-25 DIAGNOSIS — F41.9 ANXIETY: ICD-10-CM

## 2020-06-25 RX ORDER — ALPRAZOLAM 0.5 MG/1
0.5 TABLET ORAL
Qty: 30 TABLET | Refills: 0 | Status: SHIPPED | OUTPATIENT
Start: 2020-06-25 | End: 2021-02-09 | Stop reason: SDUPTHER

## 2020-08-14 ENCOUNTER — OFFICE VISIT (OUTPATIENT)
Dept: INTERNAL MEDICINE CLINIC | Facility: CLINIC | Age: 70
End: 2020-08-14
Payer: MEDICARE

## 2020-08-14 VITALS
OXYGEN SATURATION: 97 % | SYSTOLIC BLOOD PRESSURE: 120 MMHG | HEART RATE: 87 BPM | HEIGHT: 69 IN | DIASTOLIC BLOOD PRESSURE: 78 MMHG | TEMPERATURE: 98.9 F | WEIGHT: 176 LBS | BODY MASS INDEX: 26.07 KG/M2

## 2020-08-14 DIAGNOSIS — Z00.00 MEDICARE ANNUAL WELLNESS VISIT, INITIAL: Primary | ICD-10-CM

## 2020-08-14 DIAGNOSIS — N52.9 ERECTILE DYSFUNCTION, UNSPECIFIED ERECTILE DYSFUNCTION TYPE: ICD-10-CM

## 2020-08-14 DIAGNOSIS — F32.9 REACTIVE DEPRESSION: ICD-10-CM

## 2020-08-14 DIAGNOSIS — E78.2 MIXED HYPERLIPIDEMIA: ICD-10-CM

## 2020-08-14 DIAGNOSIS — D33.3 ACOUSTIC NEUROMA (HCC): ICD-10-CM

## 2020-08-14 PROCEDURE — 1036F TOBACCO NON-USER: CPT | Performed by: INTERNAL MEDICINE

## 2020-08-14 PROCEDURE — G0438 PPPS, INITIAL VISIT: HCPCS | Performed by: INTERNAL MEDICINE

## 2020-08-14 PROCEDURE — 1170F FXNL STATUS ASSESSED: CPT | Performed by: INTERNAL MEDICINE

## 2020-08-14 PROCEDURE — 1160F RVW MEDS BY RX/DR IN RCRD: CPT | Performed by: INTERNAL MEDICINE

## 2020-08-14 PROCEDURE — 3008F BODY MASS INDEX DOCD: CPT | Performed by: INTERNAL MEDICINE

## 2020-08-14 PROCEDURE — 1125F AMNT PAIN NOTED PAIN PRSNT: CPT | Performed by: INTERNAL MEDICINE

## 2020-08-14 RX ORDER — TADALAFIL 20 MG/1
20 TABLET ORAL DAILY PRN
Qty: 50 TABLET | Refills: 0 | Status: SHIPPED | OUTPATIENT
Start: 2020-08-14 | End: 2020-08-21 | Stop reason: ALTCHOICE

## 2020-08-14 NOTE — PROGRESS NOTES
Assessment and Plan:     Problem List Items Addressed This Visit        Nervous and Auditory    Acoustic neuroma ([facility] [address] )    Relevant Orders    Comprehensive metabolic panel       Other    Hyperlipidemia    Relevant Orders    Lipid panel    Comprehensive metabolic panel    Medicare annual wellness visit, initial - Primary    Reactive depression    Relevant Medications    sertraline (ZOLOFT) 50 mg tablet    Other Relevant Orders    Comprehensive metabolic panel      Other Visit Diagnoses     Erectile dysfunction, unspecified erectile dysfunction type        Relevant Medications    tadalafil (CIALIS) 20 MG tablet      ENT utd and had recent Mri  Continue sertraline  Exercise Recheck lipid panel   Rto 6 momths  Eye exam utd   BMI Counseling: Body mass index is 25 99 kg/m²  The BMI is above normal  Nutrition recommendations include consuming healthier snacks and moderation in carbohydrate intake  Exercise recommendations include exercising 3-5 times per week  No pharmacotherapy was ordered  Preventive health issues were discussed with patient, and age appropriate screening tests were ordered as noted in patient's After Visit Summary  Personalized health advice and appropriate referrals for health education or preventive services given if needed, as noted in patient's After Visit Summary  History of Present Illness:     Patient presents for Medicare Annual Wellness visit    Patient Care Team:  [place], DO as PCP - General  Gwen Dobson DO     Problem List:     Patient Active Problem List   Diagnosis    Acoustic neuroma ([facility] [address] )    Anxiety    Elevated prostate specific antigen (PSA)    Generalized osteoarthritis    Hearing loss    Hyperlipidemia    Medicare annual wellness visit, initial    Reactive depression      Past Medical and Surgical History:     History reviewed  No pertinent past medical history    Past Surgical History:   Procedure Laterality Date    ANKLE SURGERY      WISDOM TOOTH EXTRACTION      WRIST SURGERY        Family History:     Family History   Problem Relation Age of Onset    Alzheimer's disease Mother     Dementia Mother       Social History:     E-Cigarette/Vaping    E-Cigarette Use Never User      E-Cigarette/Vaping Substances    Nicotine No     THC No     CBD No     Flavoring No     Other No     Unknown No      Social History     Socioeconomic History    Marital status: /Civil Union     Spouse name: None    Number of children: None    Years of education: None    Highest education level: None   Occupational History    None   Social Needs    Financial resource strain: None    Food insecurity     Worry: None     Inability: None    Transportation needs     Medical: None     Non-medical: None   Tobacco Use    Smoking status: Never Smoker    Smokeless tobacco: Never Used    Tobacco comment: NO TOBACCO USE   Substance and Sexual Activity    Alcohol use: No    Drug use: No    Sexual activity: None   Lifestyle    Physical activity     Days per week: None     Minutes per session: None    Stress: None   Relationships    Social connections     Talks on phone: None     Gets together: None     Attends Sikhism service: None     Active member of club or organization: None     Attends meetings of clubs or organizations: None     Relationship status: None    Intimate partner violence     Fear of current or ex partner: None     Emotionally abused: None     Physically abused: None     Forced sexual activity: None   Other Topics Concern    None   Social History Narrative    ALWAYS USES  SEAT BELT    CAFFEINE USE      Medications and Allergies:     Current Outpatient Medications   Medication Sig Dispense Refill    ALPRAZolam (XANAX) 0 5 mg tablet Take 1 tablet (0 5 mg total) by mouth daily at bedtime as needed for anxiety 30 tablet 0    Cholecalciferol (VITAMIN D3) 2000 units TABS Take 1,000 Units by mouth daily      Multiple Vitamin (MULTI VITAMIN DAILY PO) Take 1 tablet by mouth daily      Naproxen Sodium (ALEVE) 220 MG CAPS Take by mouth      sertraline (ZOLOFT) 50 mg tablet Take 1 tablet (50 mg total) by mouth daily 90 tablet 1    simvastatin (ZOCOR) 20 mg tablet TAKE 1 TABLET BY MOUTH DAILY 90 tablet 3    tadalafil (CIALIS) 20 MG tablet Take 1 tablet (20 mg total) by mouth daily as needed for erectile dysfunction 50 tablet 0     No current facility-administered medications for this visit  Allergies   Allergen Reactions    Neomycin-Polymyxin-Hc      rash    Polymyxin B     Neomycin Rash      Immunizations:     Immunization History   Administered Date(s) Administered    INFLUENZA 11/09/2007, 12/12/2008, 11/12/2012, 10/14/2013    Influenza Quadrivalent Preservative Free 3 years and older IM 10/13/2015    Influenza Split High Dose Preservative Free IM 10/09/2017    Influenza TIV (IM) 11/09/2007, 12/12/2008, 11/12/2012, 10/14/2013, 10/08/2014, 10/06/2016    Influenza, high dose seasonal 0 5 mL 10/03/2018, 10/21/2019    Tdap 06/09/2009      Health Maintenance:         Topic Date Due    Hepatitis C Screening  1950         Topic Date Due    Pneumococcal Vaccine: 65+ Years (1 of 1 - PPSV23) 09/18/2016    DTaP,Tdap,and Td Vaccines (2 - Td) 06/09/2019    Influenza Vaccine  07/01/2020      Medicare Health Risk Assessment:     /78   Pulse 87   Temp 98 9 °F (37 2 °C) (Temporal)   Ht 5' 9" (1 753 m)   Wt 79 8 kg (176 lb)   SpO2 97%   BMI 25 99 kg/m²      Johnjacob Huerta is here for his Subsequent Wellness visit  Last Medicare Wellness visit information reviewed, patient interviewed, no change since last AWV  Health Risk Assessment:   Patient rates overall health as good  Patient feels that their physical health rating is same  Eyesight was rated as same  Hearing was rated as same  Patient feels that their emotional and mental health rating is much better  Pain experienced in the last 7 days has been none   Patient states that he has experienced no weight loss or gain in last 6 months  Depression Screening:   PHQ-2 Score: 1  PHQ-9 Score: 1      Fall Risk Screening: In the past year, patient has experienced: no history of falling in past year      Home Safety:  Patient does not have trouble with stairs inside or outside of their home  Patient has working smoke alarms and has working carbon monoxide detector  Home safety hazards include: none  Nutrition:   Current diet is Regular  Medications:   Patient is currently taking over-the-counter supplements  OTC medications include: see medication list  Patient is able to manage medications  Activities of Daily Living (ADLs)/Instrumental Activities of Daily Living (IADLs):   Walk and transfer into and out of bed and chair?: Yes  Dress and groom yourself?: Yes    Bathe or shower yourself?: Yes    Feed yourself?  Yes  Do your laundry/housekeeping?: Yes  Manage your money, pay your bills and track your expenses?: Yes  Make your own meals?: Yes    Do your own shopping?: Yes    Previous Hospitalizations:   Any hospitalizations or ED visits within the last 12 months?: No      Advance Care Planning:   Living will: No    Advanced directive: No    End of Life Decisions reviewed with patient: Yes    Provider agrees with end of life decisions: Yes      Cognitive Screening:   Provider or family/friend/caregiver concerned regarding cognition?: No    PREVENTIVE SCREENINGS      Cardiovascular Screening:    General: Screening Not Indicated and History Lipid Disorder      Diabetes Screening:     General: Risks and Benefits Discussed      Colorectal Cancer Screening:     General: Screening Current      Osteoporosis Screening:    General: Screening Not Indicated      Abdominal Aortic Aneurysm (AAA) Screening:    Risk factors include: age between 73-69 yo        General: Screening Not Indicated      Lung Cancer Screening:     General: Screening Not Indicated      Hepatitis C Screening:    General: Risks and Benefits Discussed and Patient Declines    Other Counseling Topics:   Regular weightbearing exercise         Albina Squires, DO

## 2020-08-19 DIAGNOSIS — N52.9 ERECTILE DYSFUNCTION, UNSPECIFIED ERECTILE DYSFUNCTION TYPE: ICD-10-CM

## 2020-08-19 DIAGNOSIS — N52.9 ERECTILE DYSFUNCTION, UNSPECIFIED ERECTILE DYSFUNCTION TYPE: Primary | ICD-10-CM

## 2020-08-19 RX ORDER — SILDENAFIL CITRATE 20 MG/1
TABLET ORAL
Qty: 50 TABLET | Refills: 1 | Status: SHIPPED | OUTPATIENT
Start: 2020-08-19 | End: 2020-08-19 | Stop reason: SDUPTHER

## 2020-08-19 RX ORDER — SILDENAFIL CITRATE 20 MG/1
TABLET ORAL
Qty: 50 TABLET | Refills: 1 | Status: SHIPPED | OUTPATIENT
Start: 2020-08-19 | End: 2020-08-21 | Stop reason: DRUGHIGH

## 2020-08-21 ENCOUNTER — TELEPHONE (OUTPATIENT)
Dept: INTERNAL MEDICINE CLINIC | Facility: CLINIC | Age: 70
End: 2020-08-21

## 2020-08-21 DIAGNOSIS — N52.9 ERECTILE DYSFUNCTION, UNSPECIFIED ERECTILE DYSFUNCTION TYPE: ICD-10-CM

## 2020-08-21 RX ORDER — SILDENAFIL 25 MG/1
25 TABLET, FILM COATED ORAL DAILY PRN
Qty: 50 TABLET | Refills: 3 | Status: SHIPPED | OUTPATIENT
Start: 2020-08-21

## 2020-08-21 NOTE — TELEPHONE ENCOUNTER
Pharmacy states sildenafil 20mg that was ordered for ED is usually ordered in the 25mg dosing at the least, and they can't rx the 20mg  Asking if you can change that script?

## 2020-09-09 DIAGNOSIS — F32.9 REACTIVE DEPRESSION: ICD-10-CM

## 2020-10-22 ENCOUNTER — IMMUNIZATIONS (OUTPATIENT)
Dept: INTERNAL MEDICINE CLINIC | Facility: CLINIC | Age: 70
End: 2020-10-22
Payer: MEDICARE

## 2020-10-22 DIAGNOSIS — Z23 ENCOUNTER FOR IMMUNIZATION: ICD-10-CM

## 2020-10-22 PROCEDURE — G0008 ADMIN INFLUENZA VIRUS VAC: HCPCS

## 2020-10-22 PROCEDURE — 90662 IIV NO PRSV INCREASED AG IM: CPT

## 2020-12-07 DIAGNOSIS — F32.9 REACTIVE DEPRESSION: ICD-10-CM

## 2021-01-11 ENCOUNTER — TELEPHONE (OUTPATIENT)
Dept: INTERNAL MEDICINE CLINIC | Facility: CLINIC | Age: 71
End: 2021-01-11

## 2021-01-11 DIAGNOSIS — Z20.822 EXPOSURE TO COVID-19 VIRUS: ICD-10-CM

## 2021-01-11 DIAGNOSIS — Z20.822 EXPOSURE TO COVID-19 VIRUS: Primary | ICD-10-CM

## 2021-01-11 PROCEDURE — U0005 INFEC AGEN DETEC AMPLI PROBE: HCPCS | Performed by: INTERNAL MEDICINE

## 2021-01-11 PROCEDURE — U0003 INFECTIOUS AGENT DETECTION BY NUCLEIC ACID (DNA OR RNA); SEVERE ACUTE RESPIRATORY SYNDROME CORONAVIRUS 2 (SARS-COV-2) (CORONAVIRUS DISEASE [COVID-19]), AMPLIFIED PROBE TECHNIQUE, MAKING USE OF HIGH THROUGHPUT TECHNOLOGIES AS DESCRIBED BY CMS-2020-01-R: HCPCS | Performed by: INTERNAL MEDICINE

## 2021-01-11 NOTE — TELEPHONE ENCOUNTER
Patient was exposed to Covid positive person on Wednesday  Person notified yesterday  Patient is asymptomatic at this time  Patient and wife concerned since the contact was in there home taking care of housebound mother

## 2021-01-12 LAB — SARS-COV-2 RNA SPEC QL NAA+PROBE: NOT DETECTED

## 2021-01-20 ENCOUNTER — TELEPHONE (OUTPATIENT)
Dept: INTERNAL MEDICINE CLINIC | Facility: CLINIC | Age: 71
End: 2021-01-20

## 2021-01-20 DIAGNOSIS — H92.09 EARACHE: Primary | ICD-10-CM

## 2021-01-20 RX ORDER — AMOXICILLIN 500 MG/1
500 CAPSULE ORAL EVERY 8 HOURS SCHEDULED
Qty: 21 CAPSULE | Refills: 0 | Status: SHIPPED | OUTPATIENT
Start: 2021-01-20 | End: 2021-01-27

## 2021-01-20 NOTE — TELEPHONE ENCOUNTER
Patient with nausea and b/l ear pressure, afebrile  Patient was tested last week for Covid-negative result      Neomycin-polymyxin-hc Not Specified  rash   Polymyxin B Not Specified     Neomycin Low Rash      [facility]

## 2021-01-25 ENCOUNTER — TELEPHONE (OUTPATIENT)
Dept: INTERNAL MEDICINE CLINIC | Facility: CLINIC | Age: 71
End: 2021-01-25

## 2021-01-25 DIAGNOSIS — B34.9 VIRAL INFECTION, UNSPECIFIED: Primary | ICD-10-CM

## 2021-01-25 LAB
FLUAV RNA RESP QL NAA+PROBE: NEGATIVE
FLUBV RNA RESP QL NAA+PROBE: NEGATIVE
RSV RNA RESP QL NAA+PROBE: NEGATIVE
SARS-COV-2 RNA RESP QL NAA+PROBE: NEGATIVE

## 2021-01-25 PROCEDURE — 0241U HB NFCT DS VIR RESP RNA 4 TRGT: CPT | Performed by: INTERNAL MEDICINE

## 2021-01-25 NOTE — TELEPHONE ENCOUNTER
Pt wife called that pt is still not feeling well even after taking an abx  He has achiness, run down, sore throat, off balance nauseated  He asked to be tested again   Please advise

## 2021-01-26 DIAGNOSIS — J32.9 SINUSITIS, UNSPECIFIED CHRONICITY, UNSPECIFIED LOCATION: Primary | ICD-10-CM

## 2021-01-26 RX ORDER — AZITHROMYCIN 250 MG/1
TABLET, FILM COATED ORAL
Qty: 6 TABLET | Refills: 0 | Status: SHIPPED | OUTPATIENT
Start: 2021-01-26 | End: 2021-01-30

## 2021-01-26 NOTE — TELEPHONE ENCOUNTER
Patient asking for zpack, states he still isn't feeling well  Still with head bhavesh, nausea and chills      [facility]

## 2021-02-04 ENCOUNTER — LAB (OUTPATIENT)
Dept: LAB | Facility: CLINIC | Age: 71
End: 2021-02-04
Payer: MEDICARE

## 2021-02-04 DIAGNOSIS — E78.2 MIXED HYPERLIPIDEMIA: ICD-10-CM

## 2021-02-04 DIAGNOSIS — D33.3 ACOUSTIC NEUROMA (HCC): ICD-10-CM

## 2021-02-04 DIAGNOSIS — F32.9 REACTIVE DEPRESSION: ICD-10-CM

## 2021-02-04 LAB
ALBUMIN SERPL BCP-MCNC: 3.1 G/DL (ref 3.5–5)
ALP SERPL-CCNC: 74 U/L (ref 46–116)
ALT SERPL W P-5'-P-CCNC: 51 U/L (ref 12–78)
ANION GAP SERPL CALCULATED.3IONS-SCNC: 4 MMOL/L (ref 4–13)
AST SERPL W P-5'-P-CCNC: 28 U/L (ref 5–45)
BILIRUB SERPL-MCNC: 0.49 MG/DL (ref 0.2–1)
BUN SERPL-MCNC: 18 MG/DL (ref 5–25)
CALCIUM ALBUM COR SERPL-MCNC: 10.5 MG/DL (ref 8.3–10.1)
CALCIUM SERPL-MCNC: 9.8 MG/DL (ref 8.3–10.1)
CHLORIDE SERPL-SCNC: 110 MMOL/L (ref 100–108)
CHOLEST SERPL-MCNC: 188 MG/DL (ref 50–200)
CO2 SERPL-SCNC: 27 MMOL/L (ref 21–32)
CREAT SERPL-MCNC: 0.73 MG/DL (ref 0.6–1.3)
GFR SERPL CREATININE-BSD FRML MDRD: 95 ML/MIN/1.73SQ M
GLUCOSE P FAST SERPL-MCNC: 89 MG/DL (ref 65–99)
HDLC SERPL-MCNC: 59 MG/DL
LDLC SERPL CALC-MCNC: 113 MG/DL (ref 0–100)
NONHDLC SERPL-MCNC: 129 MG/DL
POTASSIUM SERPL-SCNC: 4.6 MMOL/L (ref 3.5–5.3)
PROT SERPL-MCNC: 7.5 G/DL (ref 6.4–8.2)
SODIUM SERPL-SCNC: 141 MMOL/L (ref 136–145)
TRIGL SERPL-MCNC: 79 MG/DL

## 2021-02-04 PROCEDURE — 80061 LIPID PANEL: CPT

## 2021-02-04 PROCEDURE — 80053 COMPREHEN METABOLIC PANEL: CPT

## 2021-02-04 PROCEDURE — 36415 COLL VENOUS BLD VENIPUNCTURE: CPT

## 2021-02-09 ENCOUNTER — OFFICE VISIT (OUTPATIENT)
Dept: INTERNAL MEDICINE CLINIC | Facility: CLINIC | Age: 71
End: 2021-02-09
Payer: MEDICARE

## 2021-02-09 VITALS
TEMPERATURE: 97.4 F | BODY MASS INDEX: 26.38 KG/M2 | DIASTOLIC BLOOD PRESSURE: 76 MMHG | WEIGHT: 178.13 LBS | SYSTOLIC BLOOD PRESSURE: 124 MMHG | HEIGHT: 69 IN

## 2021-02-09 DIAGNOSIS — D33.3 ACOUSTIC NEUROMA (HCC): ICD-10-CM

## 2021-02-09 DIAGNOSIS — F41.9 ANXIETY: ICD-10-CM

## 2021-02-09 DIAGNOSIS — M15.9 GENERALIZED OSTEOARTHRITIS: ICD-10-CM

## 2021-02-09 DIAGNOSIS — E78.5 HYPERLIPIDEMIA, UNSPECIFIED HYPERLIPIDEMIA TYPE: Primary | ICD-10-CM

## 2021-02-09 PROCEDURE — 99214 OFFICE O/P EST MOD 30 MIN: CPT | Performed by: INTERNAL MEDICINE

## 2021-02-09 RX ORDER — ALPRAZOLAM 0.5 MG/1
0.5 TABLET ORAL
Qty: 30 TABLET | Refills: 0 | Status: SHIPPED | OUTPATIENT
Start: 2021-02-09 | End: 2021-08-24 | Stop reason: SDUPTHER

## 2021-02-09 NOTE — PROGRESS NOTES
Assessment/Plan:         Diagnoses and all orders for this visit:    Hyperlipidemia, unspecified hyperlipidemia type  Low fat diet Exercise as able     Anxiety  -     ALPRAZolam (XANAX) 0 5 mg tablet; Take 1 tablet (0 5 mg total) by mouth daily at bedtime as needed for anxiety  Pt doing well overall He has relied on this less often     Generalized osteoarthritis  Walking has helped and he tries to get out even during the winter when safe    Acoustic neuroma ([facility] [address] )  Pt states he is now 2 year followup so would be next year No issues reported    Rto for awv       Patient ID: Mimi Torres is a 72 y o  male  HPI  Pt generally well His mother in law passed away recently so they are adapting to that 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 caregivers He tries to walk but not as often due to snow and ice No falls No chest pain He did snow removal at both houses last week an no sxs He had uri sxs for almost 2 weeks - covid negative and sxs resolved now      Review of Systems   Constitutional: Negative  Not walking as much but plans to start when the weather breaks   HENT: Negative  Respiratory: Negative  Negative for cough, chest tightness and shortness of breath  Cardiovascular: Negative  Gastrointestinal: Negative  Genitourinary: Negative  Negative for difficulty urinating and flank pain  Musculoskeletal: Negative for arthralgias  Neurological: Negative for dizziness, light-headedness and headaches  Psychiatric/Behavioral: Negative for sleep disturbance  The patient is not nervous/anxious  History reviewed  No pertinent past medical history    Past Surgical History:   Procedure Laterality Date    ANKLE SURGERY      WISDOM TOOTH EXTRACTION      WRIST SURGERY       Social History     Socioeconomic History    Marital status: /Civil Union     Spouse name: Not on file    Number of children: Not on file    Years of education: Not on file    Highest education level: Not on file   Occupational History  Not on file   Social Needs    Financial resource strain: Not on file    Food insecurity     Worry: Not on file     Inability: Not on file    Transportation needs     Medical: Not on file     Non-medical: Not on file   Tobacco Use    Smoking status: Never Smoker    Smokeless tobacco: Never Used    Tobacco comment: NO TOBACCO USE   Substance and Sexual Activity    Alcohol use: No    Drug use: No    Sexual activity: Not on file   Lifestyle    Physical activity     Days per week: Not on file     Minutes per session: Not on file    Stress: Not on file   Relationships    Social connections     Talks on phone: Not on file     Gets together: Not on file     Attends Sikh service: Not on file     Active member of club or organization: Not on file     Attends meetings of clubs or organizations: Not on file     Relationship status: Not on file    Intimate partner violence     Fear of current or ex partner: Not on file     Emotionally abused: Not on file     Physically abused: Not on file     Forced sexual activity: Not on file   Other Topics Concern    Not on file   Social History Narrative    ALWAYS USES  SEAT BELT    CAFFEINE USE     Allergies   Allergen Reactions    Neomycin-Polymyxin-Hc      rash    Polymyxin B     Neomycin Rash     BMI Counseling: Body mass index is 26 3 kg/m²  The BMI is above normal  Nutrition recommendations include consuming healthier snacks and moderation in carbohydrate intake  Exercise recommendations include exercising 3-5 times per week  No pharmacotherapy was ordered  /76   Temp (!) 97 4 °F (36 3 °C) (Temporal)   Ht 5' 9" (1 753 m)   Wt 80 8 kg (178 lb 2 oz)   BMI 26 30 kg/m²          Physical Exam  Vitals signs reviewed  Constitutional:       General: He is not in acute distress  Appearance: Normal appearance  He is normal weight  He is not ill-appearing or toxic-appearing  HENT:      Head: Normocephalic and atraumatic        Right Ear: Tympanic membrane, ear canal and external ear normal  There is no impacted cerumen  Left Ear: Tympanic membrane, ear canal and external ear normal  There is no impacted cerumen  Nose: Nose normal  No rhinorrhea  Mouth/Throat:      Mouth: Mucous membranes are moist    Eyes:      General: No scleral icterus  Extraocular Movements: Extraocular movements intact  Conjunctiva/sclera: Conjunctivae normal       Pupils: Pupils are equal, round, and reactive to light  Neck:      Musculoskeletal: Normal range of motion and neck supple  No neck rigidity  Cardiovascular:      Rate and Rhythm: Normal rate and regular rhythm  Pulses: Normal pulses  Heart sounds: Normal heart sounds  No murmur  Pulmonary:      Effort: Pulmonary effort is normal  No respiratory distress  Breath sounds: Normal breath sounds  No wheezing  Abdominal:      General: Abdomen is flat  Bowel sounds are normal  There is no distension  Palpations: Abdomen is soft  Tenderness: There is no abdominal tenderness  Musculoskeletal: Normal range of motion  General: No swelling or tenderness  Lymphadenopathy:      Cervical: No cervical adenopathy  Skin:     General: Skin is dry  Coloration: Skin is not jaundiced  Neurological:      General: No focal deficit present  Mental Status: He is alert and oriented to person, place, and time  Mental status is at baseline  Cranial Nerves: No cranial nerve deficit  Motor: No weakness  Psychiatric:         Mood and Affect: Mood normal          Behavior: Behavior normal          Thought Content:  Thought content normal          Judgment: Judgment normal

## 2021-03-04 DIAGNOSIS — E78.5 HYPERLIPIDEMIA, UNSPECIFIED HYPERLIPIDEMIA TYPE: ICD-10-CM

## 2021-03-04 RX ORDER — SIMVASTATIN 20 MG
TABLET ORAL
Qty: 90 TABLET | Refills: 3 | Status: SHIPPED | OUTPATIENT
Start: 2021-03-04 | End: 2022-02-21 | Stop reason: SDUPTHER

## 2021-08-24 ENCOUNTER — OFFICE VISIT (OUTPATIENT)
Dept: FAMILY MEDICINE CLINIC | Facility: CLINIC | Age: 71
End: 2021-08-24
Payer: MEDICARE

## 2021-08-24 VITALS
DIASTOLIC BLOOD PRESSURE: 70 MMHG | WEIGHT: 187.5 LBS | SYSTOLIC BLOOD PRESSURE: 128 MMHG | HEIGHT: 69 IN | BODY MASS INDEX: 27.77 KG/M2 | TEMPERATURE: 97.2 F

## 2021-08-24 DIAGNOSIS — F32.9 REACTIVE DEPRESSION: ICD-10-CM

## 2021-08-24 DIAGNOSIS — Z12.5 SCREENING FOR PROSTATE CANCER: ICD-10-CM

## 2021-08-24 DIAGNOSIS — F41.9 ANXIETY: ICD-10-CM

## 2021-08-24 DIAGNOSIS — Z00.00 MEDICARE ANNUAL WELLNESS VISIT, SUBSEQUENT: Primary | ICD-10-CM

## 2021-08-24 PROCEDURE — 1123F ACP DISCUSS/DSCN MKR DOCD: CPT | Performed by: INTERNAL MEDICINE

## 2021-08-24 PROCEDURE — G0439 PPPS, SUBSEQ VISIT: HCPCS | Performed by: INTERNAL MEDICINE

## 2021-08-24 RX ORDER — ALPRAZOLAM 0.5 MG/1
0.5 TABLET ORAL
Qty: 30 TABLET | Refills: 0 | Status: SHIPPED | OUTPATIENT
Start: 2021-08-24 | End: 2022-02-16 | Stop reason: SDUPTHER

## 2021-08-24 NOTE — PATIENT INSTRUCTIONS

## 2021-08-24 NOTE — PROGRESS NOTES
Assessment and Plan:     Problem List Items Addressed This Visit        Other    Anxiety    Relevant Medications    ALPRAZolam (XANAX) 0 5 mg tablet    Medicare annual wellness visit, subsequent - Primary    Relevant Orders    Comprehensive metabolic panel    PSA, Total Screen    UA (URINE) with reflex to Scope    LDL cholesterol, direct    Reactive depression    Relevant Medications    sertraline (ZOLOFT) 50 mg tablet    ALPRAZolam (XANAX) 0 5 mg tablet      Other Visit Diagnoses     Screening for prostate cancer        Relevant Orders    PSA, Total Screen      Pt will call for flu shot   He is thinking about shingrix bu wants to get flu and covid booster done  Labs/psa ordered  Low fat diet and increase water intake  Walk for exercise  Eye exam utd   6 months/prn     Preventive health issues were discussed with patient, and age appropriate screening tests were ordered as noted in patient's After Visit Summary  Personalized health advice and appropriate referrals for health education or preventive services given if needed, as noted in patient's After Visit Summary  History of Present Illness:     Patient presents for Medicare Annual Wellness visit    Patient Care Team:  Javon Arevalo DO as PCP - General  Javon Arevalo DO     Problem List:     Patient Active Problem List   Diagnosis    Acoustic neuroma ([address] )    Anxiety    Elevated prostate specific antigen (PSA)    Generalized osteoarthritis    Hearing loss    Hyperlipidemia    Medicare annual wellness visit, subsequent    Reactive depression    Impotence of organic origin      Past Medical and Surgical History:     History reviewed  No pertinent past medical history    Past Surgical History:   Procedure Laterality Date    ANKLE SURGERY      WISDOM TOOTH EXTRACTION      WRIST SURGERY        Family History:     Family History   Problem Relation Age of Onset    Alzheimer's disease Mother     Dementia Mother       Social History:     Social History     Socioeconomic History    Marital status: /Civil Union     Spouse name: None    Number of children: None    Years of education: None    Highest education level: None   Occupational History    None   Tobacco Use    Smoking status: Never Smoker    Smokeless tobacco: Never Used    Tobacco comment: NO TOBACCO USE   Vaping Use    Vaping Use: Never used   Substance and Sexual Activity    Alcohol use: No    Drug use: No    Sexual activity: None   Other Topics Concern    None   Social History Narrative    ALWAYS USES  SEAT BELT    CAFFEINE USE     Social Determinants of Health     Financial Resource Strain:     Difficulty of Paying Living Expenses:    Food Insecurity:     Worried About Running Out of Food in the Last Year:     Ran Out of Food in the Last Year:    Transportation Needs:     Lack of Transportation (Medical):      Lack of Transportation (Non-Medical):    Physical Activity:     Days of Exercise per Week:     Minutes of Exercise per Session:    Stress:     Feeling of Stress :    Social Connections:     Frequency of Communication with Friends and Family:     Frequency of Social Gatherings with Friends and Family:     Attends Jain Services:     Active Member of Clubs or Organizations:     Attends Club or Organization Meetings:     Marital Status:    Intimate Partner Violence:     Fear of Current or Ex-Partner:     Emotionally Abused:     Physically Abused:     Sexually Abused:       Medications and Allergies:     Current Outpatient Medications   Medication Sig Dispense Refill    ALPRAZolam (XANAX) 0 5 mg tablet Take 1 tablet (0 5 mg total) by mouth daily at bedtime as needed for anxiety 30 tablet 0    Cholecalciferol (VITAMIN D3) 2000 units TABS Take 1,000 Units by mouth daily      Multiple Vitamin (MULTI VITAMIN DAILY PO) Take 1 tablet by mouth daily      Naproxen Sodium (ALEVE) 220 MG CAPS Take by mouth      sertraline (ZOLOFT) 50 mg tablet Take 1 tablet (50 mg total) by mouth daily 90 tablet 3    sildenafil (VIAGRA) 25 MG tablet Take 1 tablet (25 mg total) by mouth daily as needed for erectile dysfunction 50 tablet 3    simvastatin (ZOCOR) 20 mg tablet TAKE 1 TABLET BY MOUTH EVERY DAY 90 tablet 3     No current facility-administered medications for this visit  Allergies   Allergen Reactions    Neomycin-Polymyxin-Hc      rash    Polymyxin B     Neomycin Rash      Immunizations:     Immunization History   Administered Date(s) Administered    INFLUENZA 11/09/2007, 12/12/2008, 11/12/2012, 10/14/2013    Influenza Quadrivalent Preservative Free 3 years and older IM 10/13/2015    Influenza Split High Dose Preservative Free IM 10/09/2017    Influenza, high dose seasonal 0 7 mL 10/03/2018, 10/21/2019, 10/22/2020    Influenza, seasonal, injectable 11/09/2007, 12/12/2008, 11/12/2012, 10/14/2013, 10/08/2014, 10/06/2016    Tdap 06/09/2009      Health Maintenance:         Topic Date Due    Hepatitis C Screening  Never done    Colorectal Cancer Screening  11/30/2025         Topic Date Due    COVID-19 Vaccine (1) Never done    Pneumococcal Vaccine: 65+ Years (1 of 1 - PPSV23) Never done    DTaP,Tdap,and Td Vaccines (2 - Td or Tdap) 06/09/2019    Influenza Vaccine (1) 09/01/2021      Medicare Health Risk Assessment:     /70   Temp (!) 97 2 °F (36 2 °C) (Temporal)   Ht 5' 9" (1 753 m)   Wt 85 kg (187 lb 8 oz)   BMI 27 69 kg/m²      Magaly Akbar is here for his Subsequent Wellness visit  Last Medicare Wellness visit information reviewed, patient interviewed and updates made to the record today  Health Risk Assessment:   Patient rates overall health as very good  Patient feels that their physical health rating is slightly better  Patient is very satisfied with their life  Eyesight was rated as same  Hearing was rated as same  Patient feels that their emotional and mental health rating is same  Patients states they are never, rarely angry   Patient states they are never, rarely unusually tired/fatigued  Pain experienced in the last 7 days has been none  Patient states that he has experienced no weight loss or gain in last 6 months  Depression Screening:   PHQ-2 Score: 0  PHQ-9 Score: 0      Fall Risk Screening: In the past year, patient has experienced: no history of falling in past year      Home Safety:  Patient does not have trouble with stairs inside or outside of their home  Patient has working smoke alarms and has working carbon monoxide detector  Home safety hazards include: none  Nutrition:   Current diet is Regular  Medications:   Patient is currently taking over-the-counter supplements  OTC medications include: see medication list  Patient is able to manage medications  Activities of Daily Living (ADLs)/Instrumental Activities of Daily Living (IADLs):   Walk and transfer into and out of bed and chair?: Yes  Dress and groom yourself?: Yes    Bathe or shower yourself?: Yes    Feed yourself?  Yes  Do your laundry/housekeeping?: Yes  Manage your money, pay your bills and track your expenses?: Yes  Make your own meals?: Yes    Do your own shopping?: Yes    Previous Hospitalizations:   Any hospitalizations or ED visits within the last 12 months?: No      Advance Care Planning:   Living will: No    Durable POA for healthcare: No    Advanced directive: No    Advanced directive counseling given: Yes    End of Life Decisions reviewed with patient: Yes    Provider agrees with end of life decisions: Yes      Cognitive Screening:   Provider or family/friend/caregiver concerned regarding cognition?: No    PREVENTIVE SCREENINGS      Cardiovascular Screening:    General: Screening Not Indicated and History Lipid Disorder      Diabetes Screening:     General: Screening Current      Colorectal Cancer Screening:     General: Screening Current      Abdominal Aortic Aneurysm (AAA) Screening:    Risk factors include: age between 73-69 yo        Lung Cancer Screening:     General: Screening Not Indicated    Screening, Brief Intervention, and Referral to Treatment (SBIRT)    Screening  Typical number of drinks in a day: 0  Typical number of drinks in a week: 0  Interpretation: Low risk drinking behavior      AUDIT-C Screenin) How often did you have a drink containing alcohol in the past year? never  2) How many drinks did you have on a typical day when you were drinking in the past year? 0  3) How often did you have 6 or more drinks on one occasion in the past year? never    AUDIT-C Score: 0  Interpretation: Score 0-3 (male): Negative screen for alcohol misuse    Single Item Drug Screening:  How often have you used an illegal drug (including marijuana) or a prescription medication for non-medical reasons in the past year? never    Single Item Drug Screen Score: 0  Interpretation: Negative screen for possible drug use disorder      Susan Daniels, DO

## 2021-09-09 ENCOUNTER — APPOINTMENT (OUTPATIENT)
Dept: LAB | Facility: CLINIC | Age: 71
End: 2021-09-09
Payer: MEDICARE

## 2021-09-09 DIAGNOSIS — Z12.5 SCREENING FOR PROSTATE CANCER: ICD-10-CM

## 2021-09-09 DIAGNOSIS — Z00.00 MEDICARE ANNUAL WELLNESS VISIT, SUBSEQUENT: ICD-10-CM

## 2021-09-09 LAB
ALBUMIN SERPL BCP-MCNC: 3.5 G/DL (ref 3.5–5)
ALP SERPL-CCNC: 74 U/L (ref 46–116)
ALT SERPL W P-5'-P-CCNC: 30 U/L (ref 12–78)
ANION GAP SERPL CALCULATED.3IONS-SCNC: 4 MMOL/L (ref 4–13)
AST SERPL W P-5'-P-CCNC: 20 U/L (ref 5–45)
BILIRUB SERPL-MCNC: 0.63 MG/DL (ref 0.2–1)
BILIRUB UR QL STRIP: NEGATIVE
BUN SERPL-MCNC: 15 MG/DL (ref 5–25)
CALCIUM SERPL-MCNC: 8.7 MG/DL (ref 8.3–10.1)
CHLORIDE SERPL-SCNC: 108 MMOL/L (ref 100–108)
CLARITY UR: CLEAR
CO2 SERPL-SCNC: 24 MMOL/L (ref 21–32)
COLOR UR: YELLOW
CREAT SERPL-MCNC: 0.69 MG/DL (ref 0.6–1.3)
GFR SERPL CREATININE-BSD FRML MDRD: 97 ML/MIN/1.73SQ M
GLUCOSE P FAST SERPL-MCNC: 90 MG/DL (ref 65–99)
GLUCOSE UR STRIP-MCNC: NEGATIVE MG/DL
HGB UR QL STRIP.AUTO: NEGATIVE
KETONES UR STRIP-MCNC: NEGATIVE MG/DL
LDLC SERPL DIRECT ASSAY-MCNC: 105 MG/DL (ref 0–100)
LEUKOCYTE ESTERASE UR QL STRIP: NEGATIVE
NITRITE UR QL STRIP: NEGATIVE
PH UR STRIP.AUTO: 7.5 [PH]
POTASSIUM SERPL-SCNC: 4.4 MMOL/L (ref 3.5–5.3)
PROT SERPL-MCNC: 7 G/DL (ref 6.4–8.2)
PROT UR STRIP-MCNC: NEGATIVE MG/DL
PSA SERPL-MCNC: 5.2 NG/ML (ref 0–4)
SODIUM SERPL-SCNC: 136 MMOL/L (ref 136–145)
SP GR UR STRIP.AUTO: 1.01 (ref 1–1.03)
UROBILINOGEN UR QL STRIP.AUTO: 0.2 E.U./DL

## 2021-09-09 PROCEDURE — 36415 COLL VENOUS BLD VENIPUNCTURE: CPT

## 2021-09-09 PROCEDURE — 83721 ASSAY OF BLOOD LIPOPROTEIN: CPT

## 2021-09-09 PROCEDURE — 81003 URINALYSIS AUTO W/O SCOPE: CPT | Performed by: INTERNAL MEDICINE

## 2021-09-09 PROCEDURE — G0103 PSA SCREENING: HCPCS

## 2021-09-09 PROCEDURE — 80053 COMPREHEN METABOLIC PANEL: CPT

## 2022-02-16 ENCOUNTER — OFFICE VISIT (OUTPATIENT)
Dept: FAMILY MEDICINE CLINIC | Facility: CLINIC | Age: 72
End: 2022-02-16
Payer: MEDICARE

## 2022-02-16 VITALS
SYSTOLIC BLOOD PRESSURE: 128 MMHG | WEIGHT: 190 LBS | HEIGHT: 69 IN | BODY MASS INDEX: 28.14 KG/M2 | RESPIRATION RATE: 18 BRPM | HEART RATE: 76 BPM | TEMPERATURE: 98.1 F | DIASTOLIC BLOOD PRESSURE: 70 MMHG

## 2022-02-16 DIAGNOSIS — D33.3 ACOUSTIC NEUROMA (HCC): ICD-10-CM

## 2022-02-16 DIAGNOSIS — E78.5 HYPERLIPIDEMIA, UNSPECIFIED HYPERLIPIDEMIA TYPE: Primary | ICD-10-CM

## 2022-02-16 DIAGNOSIS — F41.9 ANXIETY: ICD-10-CM

## 2022-02-16 DIAGNOSIS — Z12.5 ENCOUNTER FOR SCREENING FOR MALIGNANT NEOPLASM OF PROSTATE: ICD-10-CM

## 2022-02-16 DIAGNOSIS — M15.9 GENERALIZED OSTEOARTHRITIS: ICD-10-CM

## 2022-02-16 DIAGNOSIS — R97.20 ELEVATED PSA: ICD-10-CM

## 2022-02-16 PROBLEM — H91.90 HEARING LOSS: Status: RESOLVED | Noted: 2017-02-01 | Resolved: 2022-02-16

## 2022-02-16 PROCEDURE — 99214 OFFICE O/P EST MOD 30 MIN: CPT | Performed by: INTERNAL MEDICINE

## 2022-02-16 RX ORDER — SERTRALINE HYDROCHLORIDE 25 MG/1
25 TABLET, FILM COATED ORAL DAILY
Qty: 90 TABLET | Refills: 3 | Status: SHIPPED | OUTPATIENT
Start: 2022-02-16

## 2022-02-16 RX ORDER — ALPRAZOLAM 0.5 MG/1
0.5 TABLET ORAL
Qty: 30 TABLET | Refills: 0 | Status: SHIPPED | OUTPATIENT
Start: 2022-02-16

## 2022-02-16 NOTE — PROGRESS NOTES
Assessment/Plan:         Diagnoses and all orders for this visit:    Hyperlipidemia, unspecified hyperlipidemia type  -     Lipid panel; Future  -     Comprehensive metabolic panel; Future  Low fat diet   Exercise encouraged     Anxiety  -     ALPRAZolam (XANAX) 0 5 mg tablet; Take 1 tablet (0 5 mg total) by mouth daily at bedtime as needed for anxiety  -     sertraline (Zoloft) 25 mg tablet; Take 1 tablet (25 mg total) by mouth daily  Takes prn for anxiety    Acoustic neuroma ([facility])  -     Comprehensive metabolic panel; Future  Due for MRI in the Fall     Generalized osteoarthritis  Stay active Exercise encouraged and he will walk more when the weather changes         Encounter for screening for malignant neoplasm of prostate   -     PSA, Total Screen; Future  He had seen urology in past and dx with PSA     Rto 6 months     Patient ID: Gamaliel Menard is a 80 y o  male  HPI  Pt doing ok overall He built a garage recently No chest pain or sob he cut his zoloft to 25mg and feels better No falls No limiting pain No vision change Has bilateral hearing aides No urinary issues or bowel changes He sleeps fairly well Patient is vaccinated and boosted No acute issues       Review of Systems   Constitutional: Negative for chills and fever  HENT: Negative  Respiratory: Negative for cough and shortness of breath  Cardiovascular: Negative for chest pain, palpitations and leg swelling  Gastrointestinal: Negative for abdominal distention and abdominal pain  Genitourinary: Negative  Musculoskeletal: Negative  Neurological: Negative for dizziness, light-headedness and headaches  Psychiatric/Behavioral: Negative for sleep disturbance  The patient is not nervous/anxious          Allergies   Allergen Reactions    Neomycin-Polymyxin-Hc      rash    Polymyxin B     Neomycin Rash     Social History     Socioeconomic History    Marital status: /Civil Union     Spouse name: Not on file    Number of children: Not on file    Years of education: Not on file    Highest education level: Not on file   Occupational History    Not on file   Tobacco Use    Smoking status: Never Smoker    Smokeless tobacco: Never Used    Tobacco comment: NO TOBACCO USE   Vaping Use    Vaping Use: Never used   Substance and Sexual Activity    Alcohol use: No    Drug use: No    Sexual activity: Not on file   Other Topics Concern    Not on file   Social History Narrative    ALWAYS USES  SEAT BELT    CAFFEINE USE     Social Determinants of Health     Financial Resource Strain: Not on file   Food Insecurity: Not on file   Transportation Needs: Not on file   Physical Activity: Not on file   Stress: Not on file   Social Connections: Not on file   Intimate Partner Violence: Not on file   Housing Stability: Not on file     No past medical history on file  Past Surgical History:   Procedure Laterality Date    ANKLE SURGERY      WISDOM TOOTH EXTRACTION      WRIST SURGERY       BMI Counseling: Body mass index is 28 06 kg/m²  The BMI is above normal  Nutrition recommendations include consuming healthier snacks, moderation in carbohydrate intake and increasing intake of lean protein  Exercise recommendations include exercising 3-5 times per week  Rationale for BMI follow-up plan is due to patient being overweight or obese  /70   Pulse 76   Temp 98 1 °F (36 7 °C) (Temporal)   Resp 18   Ht 5' 9" (1 753 m)   Wt 86 2 kg (190 lb)   BMI 28 06 kg/m²          Physical Exam  Vitals reviewed  Constitutional:       General: He is not in acute distress  Appearance: Normal appearance  He is not ill-appearing, toxic-appearing or diaphoretic  HENT:      Head: Normocephalic and atraumatic  Right Ear: External ear normal       Left Ear: External ear normal       Nose: Nose normal    Eyes:      General: No scleral icterus  Extraocular Movements: Extraocular movements intact        Conjunctiva/sclera: Conjunctivae normal  Pupils: Pupils are equal, round, and reactive to light  Cardiovascular:      Rate and Rhythm: Normal rate and regular rhythm  Pulses: Normal pulses  Heart sounds: Normal heart sounds  Pulmonary:      Effort: Pulmonary effort is normal       Breath sounds: Normal breath sounds  Abdominal:      General: Bowel sounds are normal  There is no distension  Palpations: Abdomen is soft  Tenderness: There is no abdominal tenderness  Musculoskeletal:      Cervical back: Normal range of motion and neck supple  No rigidity  Right lower leg: No edema  Left lower leg: No edema  Lymphadenopathy:      Cervical: No cervical adenopathy  Skin:     General: Skin is dry  Coloration: Skin is not jaundiced or pale  Findings: No erythema  Neurological:      General: No focal deficit present  Mental Status: He is alert and oriented to person, place, and time  Mental status is at baseline  Psychiatric:         Mood and Affect: Mood normal          Behavior: Behavior normal          Thought Content:  Thought content normal          Judgment: Judgment normal

## 2022-02-21 DIAGNOSIS — E78.5 HYPERLIPIDEMIA, UNSPECIFIED HYPERLIPIDEMIA TYPE: ICD-10-CM

## 2022-02-21 RX ORDER — SIMVASTATIN 20 MG
20 TABLET ORAL DAILY
Qty: 90 TABLET | Refills: 3 | Status: SHIPPED | OUTPATIENT
Start: 2022-02-21

## 2022-08-18 ENCOUNTER — RA CDI HCC (OUTPATIENT)
Dept: OTHER | Facility: HOSPITAL | Age: 72
End: 2022-08-18

## 2022-08-18 NOTE — PROGRESS NOTES
[address]  coding opportunities       Chart reviewed, no opportunity found: CHART REVIEWED, NO OPPORTUNITY FOUND        Patients Insurance     Medicare Insurance: Medicare

## 2022-08-22 ENCOUNTER — APPOINTMENT (OUTPATIENT)
Dept: LAB | Facility: CLINIC | Age: 72
End: 2022-08-22
Payer: MEDICARE

## 2022-08-22 DIAGNOSIS — D33.3 ACOUSTIC NEUROMA (HCC): ICD-10-CM

## 2022-08-22 DIAGNOSIS — R97.20 ELEVATED PSA: ICD-10-CM

## 2022-08-22 DIAGNOSIS — Z12.5 ENCOUNTER FOR SCREENING FOR MALIGNANT NEOPLASM OF PROSTATE: ICD-10-CM

## 2022-08-22 DIAGNOSIS — E78.5 HYPERLIPIDEMIA, UNSPECIFIED HYPERLIPIDEMIA TYPE: ICD-10-CM

## 2022-08-22 LAB
ALBUMIN SERPL BCP-MCNC: 3.4 G/DL (ref 3.5–5)
ALP SERPL-CCNC: 71 U/L (ref 46–116)
ALT SERPL W P-5'-P-CCNC: 28 U/L (ref 12–78)
ANION GAP SERPL CALCULATED.3IONS-SCNC: 6 MMOL/L (ref 4–13)
AST SERPL W P-5'-P-CCNC: 16 U/L (ref 5–45)
BILIRUB SERPL-MCNC: 0.49 MG/DL (ref 0.2–1)
BUN SERPL-MCNC: 13 MG/DL (ref 5–25)
CALCIUM ALBUM COR SERPL-MCNC: 9.5 MG/DL (ref 8.3–10.1)
CALCIUM SERPL-MCNC: 9 MG/DL (ref 8.3–10.1)
CHLORIDE SERPL-SCNC: 108 MMOL/L (ref 96–108)
CHOLEST SERPL-MCNC: 189 MG/DL
CO2 SERPL-SCNC: 23 MMOL/L (ref 21–32)
CREAT SERPL-MCNC: 0.75 MG/DL (ref 0.6–1.3)
GFR SERPL CREATININE-BSD FRML MDRD: 92 ML/MIN/1.73SQ M
GLUCOSE P FAST SERPL-MCNC: 88 MG/DL (ref 65–99)
HDLC SERPL-MCNC: 55 MG/DL
LDLC SERPL CALC-MCNC: 102 MG/DL (ref 0–100)
NONHDLC SERPL-MCNC: 134 MG/DL
POTASSIUM SERPL-SCNC: 4.3 MMOL/L (ref 3.5–5.3)
PROT SERPL-MCNC: 7.2 G/DL (ref 6.4–8.4)
PSA SERPL-MCNC: 6.6 NG/ML (ref 0–4)
SODIUM SERPL-SCNC: 137 MMOL/L (ref 135–147)
TRIGL SERPL-MCNC: 162 MG/DL

## 2022-08-22 PROCEDURE — 84153 ASSAY OF PSA TOTAL: CPT

## 2022-08-22 PROCEDURE — 36415 COLL VENOUS BLD VENIPUNCTURE: CPT

## 2022-08-22 PROCEDURE — 80053 COMPREHEN METABOLIC PANEL: CPT

## 2022-08-22 PROCEDURE — G0103 PSA SCREENING: HCPCS

## 2022-08-22 PROCEDURE — 80061 LIPID PANEL: CPT

## 2022-08-30 ENCOUNTER — OFFICE VISIT (OUTPATIENT)
Dept: FAMILY MEDICINE CLINIC | Facility: CLINIC | Age: 72
End: 2022-08-30
Payer: MEDICARE

## 2022-08-30 VITALS
WEIGHT: 194.4 LBS | HEART RATE: 76 BPM | TEMPERATURE: 98.6 F | SYSTOLIC BLOOD PRESSURE: 124 MMHG | HEIGHT: 69 IN | RESPIRATION RATE: 18 BRPM | DIASTOLIC BLOOD PRESSURE: 78 MMHG | BODY MASS INDEX: 28.79 KG/M2

## 2022-08-30 DIAGNOSIS — F41.9 ANXIETY: ICD-10-CM

## 2022-08-30 DIAGNOSIS — H92.01 EARACHE ON RIGHT: ICD-10-CM

## 2022-08-30 DIAGNOSIS — R35.1 BENIGN PROSTATIC HYPERPLASIA WITH NOCTURIA: Primary | ICD-10-CM

## 2022-08-30 DIAGNOSIS — N40.1 BENIGN PROSTATIC HYPERPLASIA WITH NOCTURIA: Primary | ICD-10-CM

## 2022-08-30 PROCEDURE — G0439 PPPS, SUBSEQ VISIT: HCPCS | Performed by: INTERNAL MEDICINE

## 2022-08-30 RX ORDER — OFLOXACIN 3 MG/ML
10 SOLUTION AURICULAR (OTIC) 2 TIMES DAILY
Qty: 10 ML | Refills: 1 | Status: SHIPPED | OUTPATIENT
Start: 2022-08-30

## 2022-08-30 RX ORDER — ALPRAZOLAM 0.5 MG/1
0.5 TABLET ORAL
Qty: 15 TABLET | Refills: 0 | Status: SHIPPED | OUTPATIENT
Start: 2022-08-30

## 2022-08-30 NOTE — PROGRESS NOTES
Assessment and Plan:     Problem List Items Addressed This Visit        Other    Anxiety    Relevant Medications    ALPRAZolam (XANAX) 0 5 mg tablet      Other Visit Diagnoses     Benign prostatic hyperplasia with nocturia    -  Primary    Relevant Orders    Ambulatory Referral to Urology    Earache on right        Relevant Medications    ofloxacin (FLOXIN) 0 3 % otic solution      Reviewed recent labs   Increase hydration'Urology reeval - had biopsy in 2018 negative  Continue walking  Has eye exam upcoming Had 4 Covid shots   Will get flu shot at [facility]  Rto 6 months          Preventive health issues were discussed with patient, and age appropriate screening tests were ordered as noted in patient's After Visit Summary  Personalized health advice and appropriate referrals for health education or preventive services given if needed, as noted in patient's After Visit Summary  History of Present Illness:     Patient presents for a Medicare Wellness Visit    HPI   Patient Care Team:  Magen Goff DO as PCP - General  Magen Goff DO     Review of Systems:     Review of Systems   Constitutional: Negative for chills and fever  HENT: Negative  Eyes: Negative for visual disturbance  Respiratory: Negative for cough and shortness of breath  Cardiovascular: Negative for chest pain, palpitations and leg swelling  Gastrointestinal: Negative for abdominal distention and abdominal pain  Genitourinary: Positive for frequency  Negative for hematuria  Musculoskeletal: Negative  Neurological: Negative for dizziness, light-headedness and headaches  Psychiatric/Behavioral: Negative for sleep disturbance  The patient is nervous/anxious           Problem List:     Patient Active Problem List   Diagnosis    Acoustic neuroma ([address] )    Anxiety    Elevated prostate specific antigen (PSA)    Generalized osteoarthritis    Hyperlipidemia    Medicare annual wellness visit, subsequent    Reactive depression  Impotence of organic origin      Past Medical and Surgical History:     History reviewed  No pertinent past medical history  Past Surgical History:   Procedure Laterality Date    ANKLE SURGERY      WISDOM TOOTH EXTRACTION      WRIST SURGERY        Family History:     Family History   Problem Relation Age of Onset    Alzheimer's disease Mother     Dementia Mother       Social History:     Social History     Socioeconomic History    Marital status: /Civil Union     Spouse name: None    Number of children: None    Years of education: None    Highest education level: None   Occupational History    None   Tobacco Use    Smoking status: Never Smoker    Smokeless tobacco: Never Used    Tobacco comment: NO TOBACCO USE   Vaping Use    Vaping Use: Never used   Substance and Sexual Activity    Alcohol use: No    Drug use: No    Sexual activity: None   Other Topics Concern    None   Social History Narrative    ALWAYS USES  SEAT BELT    CAFFEINE USE     Social Determinants of Health     Financial Resource Strain: Low Risk     Difficulty of Paying Living Expenses: Not hard at all   Food Insecurity: Not on file   Transportation Needs: No Transportation Needs    Lack of Transportation (Medical): No    Lack of Transportation (Non-Medical):  No   Physical Activity: Not on file   Stress: Not on file   Social Connections: Not on file   Intimate Partner Violence: Not on file   Housing Stability: Not on file      Medications and Allergies:     Current Outpatient Medications   Medication Sig Dispense Refill    ALPRAZolam (XANAX) 0 5 mg tablet Take 1 tablet (0 5 mg total) by mouth daily at bedtime as needed for anxiety 15 tablet 0    Cholecalciferol (VITAMIN D3) 2000 units TABS Take 1,000 Units by mouth daily      Multiple Vitamin (MULTI VITAMIN DAILY PO) Take 1 tablet by mouth daily      Naproxen Sodium 220 MG CAPS Take by mouth      ofloxacin (FLOXIN) 0 3 % otic solution Administer 10 drops to the right ear 2 (two) times a day 10 mL 1    sertraline (Zoloft) 25 mg tablet Take 1 tablet (25 mg total) by mouth daily 90 tablet 3    sildenafil (VIAGRA) 25 MG tablet Take 1 tablet (25 mg total) by mouth daily as needed for erectile dysfunction 50 tablet 3    simvastatin (ZOCOR) 20 mg tablet Take 1 tablet (20 mg total) by mouth daily 90 tablet 3     No current facility-administered medications for this visit  Allergies   Allergen Reactions    Neomycin-Polymyxin-Hc      rash    Polymyxin B     Neomycin Rash      Immunizations:     Immunization History   Administered Date(s) Administered    INFLUENZA 11/09/2007, 12/12/2008, 11/12/2012, 10/14/2013    Influenza Quadrivalent Preservative Free 3 years and older IM 10/13/2015    Influenza Split High Dose Preservative Free IM 10/09/2017    Influenza, high dose seasonal 0 7 mL 10/03/2018, 10/21/2019, 10/22/2020    Influenza, seasonal, injectable 11/09/2007, 12/12/2008, 11/12/2012, 10/14/2013, 10/08/2014, 10/06/2016    Tdap 06/09/2009      Health Maintenance:         Topic Date Due    Hepatitis C Screening  Never done    Colorectal Cancer Screening  11/30/2025         Topic Date Due    Pneumococcal Vaccine: 65+ Years (1 - PCV) Never done    COVID-19 Vaccine (2 - Chidi Charles series) 08/14/2022    Influenza Vaccine (1) 09/01/2022      Medicare Screening Tests and Risk Assessments:     Stacey Garcia is here for his Subsequent Wellness visit  Last Medicare Wellness visit information reviewed, patient interviewed, no change since last AWV  Health Risk Assessment:   Patient rates overall health as very good  Patient feels that their physical health rating is same  Patient is dissatisfied with their life  Eyesight was rated as same  Hearing was rated as same  Patient feels that their emotional and mental health rating is same  Patients states they are never, rarely angry  Patient states they are sometimes unusually tired/fatigued   Pain experienced in the last 7 days has been none  Patient states that he has experienced no weight loss or gain in last 6 months  Depression Screening:   PHQ-9 Score: 2      Fall Risk Screening: In the past year, patient has experienced: no history of falling in past year      Home Safety:  Patient does not have trouble with stairs inside or outside of their home  Patient has working smoke alarms and has working carbon monoxide detector  Home safety hazards include: none  Nutrition:   Current diet is Regular  Medications:   Patient is currently taking over-the-counter supplements  OTC medications include: see medication list  Patient is able to manage medications  Activities of Daily Living (ADLs)/Instrumental Activities of Daily Living (IADLs):   Walk and transfer into and out of bed and chair?: Yes  Dress and groom yourself?: Yes    Bathe or shower yourself?: Yes    Feed yourself? Yes  Do your laundry/housekeeping?: Yes  Manage your money, pay your bills and track your expenses?: Yes  Make your own meals?: Yes    Do your own shopping?: Yes    Previous Hospitalizations:   Any hospitalizations or ED visits within the last 12 months?: No      Advance Care Planning:   Living will: Yes    Durable POA for healthcare:  Yes    Advanced directive: Yes    End of Life Decisions reviewed with patient: Yes    Provider agrees with end of life decisions: Yes      Cognitive Screening:   Provider or family/friend/caregiver concerned regarding cognition?: No    PREVENTIVE SCREENINGS      Cardiovascular Screening:    General: History Lipid Disorder and Screening Current      Diabetes Screening:     General: Screening Current      Colorectal Cancer Screening:     General: Screening Current      Prostate Cancer Screening:    General: Screening Current      Osteoporosis Screening:    General: Screening Not Indicated      Abdominal Aortic Aneurysm (AAA) Screening:    Risk factors include: age between 73-69 yo        Lung Cancer Screening:     General: Screening Not Indicated      Hepatitis C Screening:    General: Patient Declines    Screening, Brief Intervention, and Referral to Treatment (SBIRT)    Screening  Typical number of drinks in a day: 0  Typical number of drinks in a week: 0  Interpretation: Low risk drinking behavior  AUDIT-C Screenin) How often did you have a drink containing alcohol in the past year? never  2) How many drinks did you have on a typical day when you were drinking in the past year? 0  3) How often did you have 6 or more drinks on one occasion in the past year? never    AUDIT-C Score: 0  Interpretation: Score 0-3 (male): Negative screen for alcohol misuse    Single Item Drug Screening:  How often have you used an illegal drug (including marijuana) or a prescription medication for non-medical reasons in the past year? never    Single Item Drug Screen Score: 0  Interpretation: Negative screen for possible drug use disorder    Brief Intervention  Alcohol & drug use screenings were reviewed  No concerns regarding substance use disorder identified  Other Counseling Topics:   Regular weightbearing exercise and calcium and vitamin D intake  No exam data present     Physical Exam:     /78   Pulse 76   Temp 98 6 °F (37 °C) (Tympanic Core)   Resp 18   Ht 5' 9" (1 753 m)   Wt 88 2 kg (194 lb 6 4 oz)   BMI 28 71 kg/m²     Physical Exam  Vitals reviewed  Constitutional:       General: He is not in acute distress  Appearance: Normal appearance  He is not ill-appearing, toxic-appearing or diaphoretic  HENT:      Head: Normocephalic and atraumatic  Right Ear: External ear normal       Left Ear: External ear normal       Nose: Nose normal       Mouth/Throat:      Mouth: Mucous membranes are dry  Eyes:      General: No scleral icterus  Extraocular Movements: Extraocular movements intact  Conjunctiva/sclera: Conjunctivae normal       Pupils: Pupils are equal, round, and reactive to light  Cardiovascular:      Rate and Rhythm: Normal rate and regular rhythm  Pulses: Normal pulses  Heart sounds: Normal heart sounds  Pulmonary:      Effort: Pulmonary effort is normal  No respiratory distress  Breath sounds: Normal breath sounds  No wheezing  Abdominal:      General: Bowel sounds are normal  There is no distension  Palpations: Abdomen is soft  Tenderness: There is no abdominal tenderness  Musculoskeletal:      Cervical back: Normal range of motion and neck supple  No rigidity  Right lower leg: No edema  Left lower leg: No edema  Lymphadenopathy:      Cervical: No cervical adenopathy  Skin:     General: Skin is dry  Coloration: Skin is not jaundiced or pale  Neurological:      General: No focal deficit present  Mental Status: He is alert and oriented to person, place, and time  Mental status is at baseline  Cranial Nerves: No cranial nerve deficit  Psychiatric:         Mood and Affect: Mood normal          Behavior: Behavior normal          Thought Content:  Thought content normal          Judgment: Judgment normal           Nathaniel Polanco DO

## 2022-08-30 NOTE — PATIENT INSTRUCTIONS
Medicare Preventive Visit Patient Instructions  Thank you for completing your Welcome to Medicare Visit or Medicare Annual Wellness Visit today  Your next wellness visit will be due in one year (8/31/2023)  The screening/preventive services that you may require over the next 5-10 years are detailed below  Some tests may not apply to you based off risk factors and/or age  Screening tests ordered at today's visit but not completed yet may show as past due  Also, please note that scanned in results may not display below  Preventive Screenings:  Service Recommendations Previous Testing/Comments   Colorectal Cancer Screening  · Colonoscopy    · Fecal Occult Blood Test (FOBT)/Fecal Immunochemical Test (FIT)  · Fecal DNA/Cologuard Test  · Flexible Sigmoidoscopy Age: 39-70 years old   Colonoscopy: every 10 years (May be performed more frequently if at higher risk)  OR  FOBT/FIT: every 1 year  OR  Cologuard: every 3 years  OR  Sigmoidoscopy: every 5 years  Screening may be recommended earlier than age 39 if at higher risk for colorectal cancer  Also, an individualized decision between you and your healthcare provider will decide whether screening between the ages of 74-80 would be appropriate   Colonoscopy: 11/30/2015  FOBT/FIT: Not on file  Cologuard: Not on file  Sigmoidoscopy: Not on file    Screening Current     Prostate Cancer Screening Individualized decision between patient and health care provider in men between ages of 53-78   Medicare will cover every 12 months beginning on the day after your 50th birthday PSA: 6 6 ng/mL     Screening Current     Hepatitis C Screening Once for adults born [facility]  More frequently in patients at high risk for Hepatitis C Hep C Antibody: Not on file        Diabetes Screening 1-2 times per year if you're at risk for diabetes or have pre-diabetes Fasting glucose: 88 mg/dL (8/22/2022)  A1C: No results in last 5 years (No results in last 5 years)  Screening Current Cholesterol Screening Once every 5 years if you don't have a lipid disorder  May order more often based on risk factors  Lipid panel: 08/22/2022  Screening Not Indicated  History Lipid Disorder      Other Preventive Screenings Covered by Medicare:  1  Abdominal Aortic Aneurysm (AAA) Screening: covered once if your at risk  You're considered to be at risk if you have a family history of AAA or a male between the age of 73-68 who smoking at least 100 cigarettes in your lifetime  2  Lung Cancer Screening: covers low dose CT scan once per year if you meet all of the following conditions: (1) Age 50-69; (2) No signs or symptoms of lung cancer; (3) Current smoker or have quit smoking within the last 15 years; (4) You have a tobacco smoking history of at least 20 pack years (packs per day x number of years you smoked); (5) You get a written order from a healthcare provider  3  Glaucoma Screening: covered annually if you're considered high risk: (1) You have diabetes OR (2) Family history of glaucoma OR (3)  aged 48 and older OR (3)  American aged 72 and older  3  Osteoporosis Screening: covered every 2 years if you meet one of the following conditions: (1) Have a vertebral abnormality; (2) On glucocorticoid therapy for more than 3 months; (3) Have primary hyperparathyroidism; (4) On osteoporosis medications and need to assess response to drug therapy  5  HIV Screening: covered annually if you're between the age of 12-76  Also covered annually if you are younger than 13 and older than 72 with risk factors for HIV infection  For pregnant patients, it is covered up to 3 times per pregnancy      Immunizations:  Immunization Recommendations   Influenza Vaccine Annual influenza vaccination during flu season is recommended for all persons aged >= 6 months who do not have contraindications   Pneumococcal Vaccine   * Pneumococcal conjugate vaccine = PCV13 (Prevnar 13), PCV15 (Vaxneuvance), PCV20 (Prevnar 20)  * Pneumococcal polysaccharide vaccine = PPSV23 (Pneumovax) Adults 2364 years old: 1-3 doses may be recommended based on certain risk factors  Adults 72 years old: 1-2 doses may be recommended based off what pneumonia vaccine you previously received   Hepatitis B Vaccine 3 dose series if at intermediate or high risk (ex: diabetes, end stage renal disease, liver disease)   Tetanus (Td) Vaccine - COST NOT COVERED BY MEDICARE PART B Following completion of primary series, a booster dose should be given every 10 years to maintain immunity against tetanus  Td may also be given as tetanus wound prophylaxis  Tdap Vaccine - COST NOT COVERED BY MEDICARE PART B Recommended at least once for all adults  For pregnant patients, recommended with each pregnancy  Shingles Vaccine (Shingrix) - COST NOT COVERED BY MEDICARE PART B  2 shot series recommended in those aged 48 and above     Health Maintenance Due:      Topic Date Due    Hepatitis C Screening  Never done    Colorectal Cancer Screening  11/30/2025     Immunizations Due:      Topic Date Due    Pneumococcal Vaccine: 65+ Years (1 - PCV) Never done    COVID-19 Vaccine (2 - Moderna series) 08/14/2022    Influenza Vaccine (1) 09/01/2022     Advance Directives   What are advance directives? Advance directives are legal documents that state your wishes and plans for medical care  These plans are made ahead of time in case you lose your ability to make decisions for yourself  Advance directives can apply to any medical decision, such as the treatments you want, and if you want to donate organs  What are the types of advance directives? There are many types of advance directives, and each state has rules about how to use them  You may choose a combination of any of the following:  · Living will: This is a written record of the treatment you want  You can also choose which treatments you do not want, which to limit, and which to stop at a certain time   This includes surgery, medicine, IV fluid, and tube feedings  · Durable power of  for healthcare [facility]): This is a written record that states who you want to make healthcare choices for you when you are unable to make them for yourself  This person, called a proxy, is usually a family member or a friend  You may choose more than 1 proxy  · Do not resuscitate (DNR) order:  A DNR order is used in case your heart stops beating or you stop breathing  It is a request not to have certain forms of treatment, such as CPR  A DNR order may be included in other types of advance directives  · Medical directive: This covers the care that you want if you are in a coma, near death, or unable to make decisions for yourself  You can list the treatments you want for each condition  Treatment may include pain medicine, surgery, blood transfusions, dialysis, IV or tube feedings, and a ventilator (breathing machine)  · Values history: This document has questions about your views, beliefs, and how you feel and think about life  This information can help others choose the care that you would choose  Why are advance directives important? An advance directive helps you control your care  Although spoken wishes may be used, it is better to have your wishes written down  Spoken wishes can be misunderstood, or not followed  Treatments may be given even if you do not want them  An advance directive may make it easier for your family to make difficult choices about your care  Weight Management   Why it is important to manage your weight:  Being overweight increases your risk of health conditions such as heart disease, high blood pressure, type 2 diabetes, and certain types of cancer  It can also increase your risk for osteoarthritis, sleep apnea, and other respiratory problems  Aim for a slow, steady weight loss  Even a small amount of weight loss can lower your risk of health problems    How to lose weight safely:  A safe and healthy way to lose weight is to eat fewer calories and get regular exercise  You can lose up about 1 pound a week by decreasing the number of calories you eat by 500 calories each day  Healthy meal plan for weight management:  A healthy meal plan includes a variety of foods, contains fewer calories, and helps you stay healthy  A healthy meal plan includes the following:  · Eat whole-grain foods more often  A healthy meal plan should contain fiber  Fiber is the part of grains, fruits, and vegetables that is not broken down by your body  Whole-grain foods are healthy and provide extra fiber in your diet  Some examples of whole-grain foods are whole-wheat breads and pastas, oatmeal, brown rice, and bulgur  · Eat a variety of vegetables every day  Include dark, leafy greens such as spinach, kale, shabnam greens, and mustard greens  Eat yellow and orange vegetables such as carrots, sweet potatoes, and winter squash  · Eat a variety of fruits every day  Choose fresh or canned fruit (canned in its own juice or light syrup) instead of juice  Fruit juice has very little or no fiber  · Eat low-fat dairy foods  Drink fat-free (skim) milk or 1% milk  Eat fat-free yogurt and low-fat cottage cheese  Try low-fat cheeses such as mozzarella and other reduced-fat cheeses  · Choose meat and other protein foods that are low in fat  Choose beans or other legumes such as split peas or lentils  Choose fish, skinless poultry (chicken or turkey), or lean cuts of red meat (beef or pork)  Before you cook meat or poultry, cut off any visible fat  · Use less fat and oil  Try baking foods instead of frying them  Add less fat, such as margarine, sour cream, regular salad dressing and mayonnaise to foods  Eat fewer high-fat foods  Some examples of high-fat foods include french fries, doughnuts, ice cream, and cakes  · Eat fewer sweets  Limit foods and drinks that are high in sugar   This includes candy, cookies, regular soda, and sweetened drinks  Exercise:  Exercise at least 30 minutes per day on most days of the week  Some examples of exercise include walking, biking, dancing, and swimming  You can also fit in more physical activity by taking the stairs instead of the elevator or parking farther away from stores  Ask your healthcare provider about the best exercise plan for you  © Copyright [employer] 2018 Information is for End User's use only and may not be sold, redistributed or otherwise used for commercial purposes   All illustrations and images included in CareNotes® are the copyrighted property of [employer]  or [address] [place] [address] [employer]

## 2022-10-12 ENCOUNTER — OFFICE VISIT (OUTPATIENT)
Dept: UROLOGY | Facility: CLINIC | Age: 72
End: 2022-10-12
Payer: MEDICARE

## 2022-10-12 VITALS
BODY MASS INDEX: 28.94 KG/M2 | WEIGHT: 196 LBS | DIASTOLIC BLOOD PRESSURE: 80 MMHG | SYSTOLIC BLOOD PRESSURE: 170 MMHG | HEART RATE: 76 BPM

## 2022-10-12 DIAGNOSIS — R97.20 ELEVATED PSA: Primary | ICD-10-CM

## 2022-10-12 DIAGNOSIS — N40.1 BENIGN PROSTATIC HYPERPLASIA WITH NOCTURIA: ICD-10-CM

## 2022-10-12 DIAGNOSIS — R35.1 BENIGN PROSTATIC HYPERPLASIA WITH NOCTURIA: ICD-10-CM

## 2022-10-12 PROBLEM — Z00.00 MEDICARE ANNUAL WELLNESS VISIT, SUBSEQUENT: Status: RESOLVED | Noted: 2018-12-18 | Resolved: 2022-10-12

## 2022-10-12 LAB — POST-VOID RESIDUAL VOLUME, ML POC: 65 ML

## 2022-10-12 PROCEDURE — 99203 OFFICE O/P NEW LOW 30 MIN: CPT

## 2022-10-12 PROCEDURE — 51798 US URINE CAPACITY MEASURE: CPT

## 2022-10-12 RX ORDER — TAMSULOSIN HYDROCHLORIDE 0.4 MG/1
0.4 CAPSULE ORAL
Qty: 30 CAPSULE | Refills: 3 | Status: SHIPPED | OUTPATIENT
Start: 2022-10-12

## 2022-10-12 NOTE — PROGRESS NOTES
10/12/2022    No chief complaint on file  Assessment and Plan    71 y o  male new patient to office    1  Elevated PSA  · PSA trend  · 6 6 (08/22/2022)  · 5 2 (09/09/2021)  · 5 5 (04/27/2018)  · Denies any family history of prostate cancer  · CHRISSY reveals smooth symmetric prostate, no palpable nodules or masses  · Negative prostate biopsy 7/2018 by Dr Paola Gonzalez  · 11/12 zones BPH  · Right apex revealed atypical hyperplasia with no evidence of prostate carcinoma  · Based on history of elevated PSA and prior biopsy results showing atypical hyperplasia in the right apex, I recommend a transperineal prostate biopsy  Patient is agreeable to this  · Technique, benefits, and risk of the procedure listed above were discussed with them today and informed written consent was obtained  All questions and concerns regarding surgery and perioperative expectations have been addressed and answered  2  BPH with LUTS  · Complaints of urinary frequency, nocturia, weak urinary stream, and incomplete emptying  · PVR today 65 mL  · Recommend trial of tamsulosin 0 4 mg daily  · Follow-up after prostate biopsy to reassess symptoms  History of Present Illness  Conrado Barrera is a 71 y o  male new patient to office  Here for evaluation of nocturia and elevated PSA  He had previously been seen by Dr Paola Gonzalez for elevated PSA and had a negative prostate biopsy in 2018  At the time of his last biopsy he states that Dr Paola Gonzalez told him, there was one area of the prostate he wanted to keep an eye on  He last saw Dr Paola Gonzalez about 3 years ago  His most recent PSA is 6 6 as of August of 2022 previously 5 2 in September of 2021 and 5 5 in April of 2018  I was able to review Dr Licha Sultana office note from 7/17/2018 under media tab (7/18/2018 Consult Notes Ext)  His prostate biopsy from 2018 showed 11/12 zone with BPH, Right apex revealed atypical hyperplasia with no evidence of prostate carcinoma      He does complain of urinary frequency, nocturia 3 time per night, incomplete bladder emptying, and weak urinary stream  He reports his symptoms worsen with what he drinks specifically with coffee  He drinks about 3 cups of coffee per day and 2 glasses of water per day  Denies alcohol use  He is not currently on any alpha blockers for BPH  Review of Systems   Constitutional: Negative for chills and fever  HENT: Negative for congestion and sore throat  Respiratory: Negative for cough and shortness of breath  Cardiovascular: Negative for chest pain and leg swelling  Gastrointestinal: Negative for abdominal pain, constipation, diarrhea, nausea and vomiting  Genitourinary: Positive for difficulty urinating, frequency and urgency  Negative for dysuria and hematuria  Nocturia   Musculoskeletal: Negative for back pain and gait problem  Skin: Negative for wound  Allergic/Immunologic: Negative for immunocompromised state  Neurological: Negative for dizziness, weakness and numbness  Hematological: Does not bruise/bleed easily             AUA SYMPTOM SCORE    Flowsheet Row Most Recent Value   AUA SYMPTOM SCORE    How often have you had a sensation of not emptying your bladder completely after you finished urinating? 5 (P)     How often have you had to urinate again less than two hours after you finished urinating? 4 (P)     How often have you found you stopped and started again several times when you urinate? 5 (P)     How often have you found it difficult to postpone urination? 1 (P)     How often have you had a weak urinary stream? 3 (P)     How often have you had to push or strain to begin urination? 1 (P)     How many times did you most typically get up to urinate from the time you went to bed at night until the time you got up in the morning? 5 (P)     Quality of Life: If you were to spend the rest of your life with your urinary condition just the way it is now, how would you feel about that? 4 (P)     AUA SYMPTOM SCORE 24 (P)           Vitals  There were no vitals filed for this visit  Physical Exam  Vitals reviewed  Constitutional:       General: He is not in acute distress  Appearance: Normal appearance  He is not ill-appearing or toxic-appearing  HENT:      Head: Normocephalic and atraumatic  Eyes:      General: No scleral icterus  Conjunctiva/sclera: Conjunctivae normal    Cardiovascular:      Rate and Rhythm: Normal rate  Pulmonary:      Effort: Pulmonary effort is normal  No respiratory distress  Abdominal:      Palpations: Abdomen is soft  Tenderness: There is no abdominal tenderness  There is no right CVA tenderness or left CVA tenderness  Hernia: No hernia is present  Genitourinary:     Comments: CHRISSY reveals smooth symmetric prostate, no palpable nodules masses  Musculoskeletal:      Cervical back: Normal range of motion  Right lower leg: No edema  Left lower leg: No edema  Skin:     General: Skin is warm and dry  Coloration: Skin is not jaundiced or pale  Neurological:      General: No focal deficit present  Mental Status: He is alert and oriented to person, place, and time  Mental status is at baseline  Gait: Gait normal    Psychiatric:         Mood and Affect: Mood normal          Behavior: Behavior normal          Thought Content: Thought content normal          Judgment: Judgment normal          Past History  No past medical history on file    Social History     Socioeconomic History   • Marital status: /Civil Union     Spouse name: Not on file   • Number of children: Not on file   • Years of education: Not on file   • Highest education level: Not on file   Occupational History   • Not on file   Tobacco Use   • Smoking status: Never Smoker   • Smokeless tobacco: Never Used   • Tobacco comment: NO TOBACCO USE   Vaping Use   • Vaping Use: Never used   Substance and Sexual Activity   • Alcohol use: No   • Drug use: No   • Sexual activity: Not on file Other Topics Concern   • Not on file   Social History Narrative    ALWAYS USES  SEAT BELT    CAFFEINE USE     Social Determinants of Health     Financial Resource Strain: Low Risk    • Difficulty of Paying Living Expenses: Not hard at all   Food Insecurity: Not on file   Transportation Needs: No Transportation Needs   • Lack of Transportation (Medical): No   • Lack of Transportation (Non-Medical): No   Physical Activity: Not on file   Stress: Not on file   Social Connections: Not on file   Intimate Partner Violence: Not on file   Housing Stability: Not on file     Social History     Tobacco Use   Smoking Status Never Smoker   Smokeless Tobacco Never Used   Tobacco Comment    NO TOBACCO USE     Family History   Problem Relation Age of Onset   • Alzheimer's disease Mother    • Dementia Mother        The following portions of the patient's history were reviewed and updated as appropriate allergies, current medications, past medical history, past social history, past surgical history and problem list    Imaging:    Results  No results found for this or any previous visit (from the past 1 hour(s)) ]  Lab Results   Component Value Date    PSA 6 6 (H) 08/22/2022    PSA 5 2 (H) 09/09/2021    PSA 5 5 (H) 04/27/2018    PSA 4 8 03/17/2018     Lab Results   Component Value Date    GLUCOSE 84 03/18/2017    CALCIUM 9 0 08/22/2022     03/18/2017    K 4 3 08/22/2022    CO2 23 08/22/2022     08/22/2022    BUN 13 08/22/2022    CREATININE 0 75 08/22/2022     Lab Results   Component Value Date    WBC 4 7 03/17/2018    HGB 16 1 03/17/2018    HCT 45 3 03/17/2018    MCV 90 9 03/18/2017     03/17/2018       Please Note:  Voice dictation software has been used to create this document  There may be inadvertent transcriptions errors       Michael Beckwith

## 2022-10-17 ENCOUNTER — TELEPHONE (OUTPATIENT)
Dept: UROLOGY | Facility: MEDICAL CENTER | Age: 72
End: 2022-10-17

## 2022-10-17 NOTE — TELEPHONE ENCOUNTER
I spoke to the patient and scheduled his procedure for 12/5/2022 at [facility] with Dr Amaya Reilly     -instructions given verbally and mailed  -patient aware to be NPO, needs a  and use an enema 1 hour prior to leaving the house morning of procedure  -patient aware to avoid any potentially blood thinning medications 7 days prior  -CBC, CMP, Urine C&S and EKG 2 weeks prior  -M[facility]/[employer] - no auth required

## 2022-11-03 DIAGNOSIS — N40.1 BENIGN PROSTATIC HYPERPLASIA WITH NOCTURIA: ICD-10-CM

## 2022-11-03 DIAGNOSIS — R35.1 BENIGN PROSTATIC HYPERPLASIA WITH NOCTURIA: ICD-10-CM

## 2022-11-03 RX ORDER — TAMSULOSIN HYDROCHLORIDE 0.4 MG/1
CAPSULE ORAL
Qty: 90 CAPSULE | Refills: 2 | Status: SHIPPED | OUTPATIENT
Start: 2022-11-03

## 2022-11-18 ENCOUNTER — OFFICE VISIT (OUTPATIENT)
Dept: LAB | Facility: HOSPITAL | Age: 72
End: 2022-11-18
Attending: UROLOGY

## 2022-11-18 ENCOUNTER — APPOINTMENT (OUTPATIENT)
Dept: LAB | Facility: HOSPITAL | Age: 72
End: 2022-11-18
Attending: UROLOGY

## 2022-11-18 DIAGNOSIS — Z01.810 PRE-OPERATIVE CARDIOVASCULAR EXAMINATION: ICD-10-CM

## 2022-11-18 DIAGNOSIS — Z01.812 PRE-OPERATIVE LABORATORY EXAMINATION: ICD-10-CM

## 2022-11-18 DIAGNOSIS — R97.20 ELEVATED PSA: ICD-10-CM

## 2022-11-18 DIAGNOSIS — R39.89 SUSPECTED UTI: ICD-10-CM

## 2022-11-18 LAB
ATRIAL RATE: 78 BPM
P AXIS: 68 DEGREES
PR INTERVAL: 194 MS
QRS AXIS: 0 DEGREES
QRSD INTERVAL: 86 MS
QT INTERVAL: 368 MS
QTC INTERVAL: 419 MS
T WAVE AXIS: -4 DEGREES
VENTRICULAR RATE: 78 BPM

## 2022-11-19 LAB — BACTERIA UR CULT: NORMAL

## 2022-11-21 ENCOUNTER — APPOINTMENT (OUTPATIENT)
Dept: LAB | Facility: CLINIC | Age: 72
End: 2022-11-21

## 2022-11-21 DIAGNOSIS — Z01.812 PRE-OPERATIVE LABORATORY EXAMINATION: ICD-10-CM

## 2022-11-21 DIAGNOSIS — R97.20 ELEVATED PSA: ICD-10-CM

## 2022-11-21 LAB
ALBUMIN SERPL BCP-MCNC: 3.4 G/DL (ref 3.5–5)
ALP SERPL-CCNC: 69 U/L (ref 46–116)
ALT SERPL W P-5'-P-CCNC: 33 U/L (ref 12–78)
ANION GAP SERPL CALCULATED.3IONS-SCNC: 3 MMOL/L (ref 4–13)
AST SERPL W P-5'-P-CCNC: 17 U/L (ref 5–45)
BASOPHILS # BLD AUTO: 0.03 THOUSANDS/ÂΜL (ref 0–0.1)
BASOPHILS NFR BLD AUTO: 1 % (ref 0–1)
BILIRUB SERPL-MCNC: 0.41 MG/DL (ref 0.2–1)
BUN SERPL-MCNC: 15 MG/DL (ref 5–25)
CALCIUM ALBUM COR SERPL-MCNC: 10 MG/DL (ref 8.3–10.1)
CALCIUM SERPL-MCNC: 9.5 MG/DL (ref 8.3–10.1)
CHLORIDE SERPL-SCNC: 108 MMOL/L (ref 96–108)
CO2 SERPL-SCNC: 27 MMOL/L (ref 21–32)
CREAT SERPL-MCNC: 0.83 MG/DL (ref 0.6–1.3)
EOSINOPHIL # BLD AUTO: 0.07 THOUSAND/ÂΜL (ref 0–0.61)
EOSINOPHIL NFR BLD AUTO: 1 % (ref 0–6)
ERYTHROCYTE [DISTWIDTH] IN BLOOD BY AUTOMATED COUNT: 12.5 % (ref 11.6–15.1)
GFR SERPL CREATININE-BSD FRML MDRD: 88 ML/MIN/1.73SQ M
GLUCOSE P FAST SERPL-MCNC: 94 MG/DL (ref 65–99)
HCT VFR BLD AUTO: 45.8 % (ref 36.5–49.3)
HGB BLD-MCNC: 15.1 G/DL (ref 12–17)
IMM GRANULOCYTES # BLD AUTO: 0.01 THOUSAND/UL (ref 0–0.2)
IMM GRANULOCYTES NFR BLD AUTO: 0 % (ref 0–2)
LYMPHOCYTES # BLD AUTO: 2.5 THOUSANDS/ÂΜL (ref 0.6–4.47)
LYMPHOCYTES NFR BLD AUTO: 45 % (ref 14–44)
MCH RBC QN AUTO: 29.8 PG (ref 26.8–34.3)
MCHC RBC AUTO-ENTMCNC: 33 G/DL (ref 31.4–37.4)
MCV RBC AUTO: 90 FL (ref 82–98)
MONOCYTES # BLD AUTO: 0.56 THOUSAND/ÂΜL (ref 0.17–1.22)
MONOCYTES NFR BLD AUTO: 10 % (ref 4–12)
NEUTROPHILS # BLD AUTO: 2.4 THOUSANDS/ÂΜL (ref 1.85–7.62)
NEUTS SEG NFR BLD AUTO: 43 % (ref 43–75)
NRBC BLD AUTO-RTO: 0 /100 WBCS
PLATELET # BLD AUTO: 215 THOUSANDS/UL (ref 149–390)
PMV BLD AUTO: 10.5 FL (ref 8.9–12.7)
POTASSIUM SERPL-SCNC: 4.1 MMOL/L (ref 3.5–5.3)
PROT SERPL-MCNC: 7.3 G/DL (ref 6.4–8.4)
RBC # BLD AUTO: 5.07 MILLION/UL (ref 3.88–5.62)
SODIUM SERPL-SCNC: 138 MMOL/L (ref 135–147)
WBC # BLD AUTO: 5.57 THOUSAND/UL (ref 4.31–10.16)

## 2022-11-30 NOTE — PRE-PROCEDURE INSTRUCTIONS
Pre-Surgery Instructions:   Medication Instructions   • ALPRAZolam (XANAX) 0 5 mg tablet Uses PRN- OK to take day of surgery   • Cholecalciferol (VITAMIN D3) 2000 units TABS Stop taking 7 days prior to surgery  • Multiple Vitamin (MULTI VITAMIN DAILY PO) Stop taking 7 days prior to surgery  • Naproxen Sodium 220 MG CAPS Stop taking 7 days prior to surgery     • sertraline (Zoloft) 25 mg tablet Take night before surgery   • simvastatin (ZOCOR) 20 mg tablet Take night before surgery   • tamsulosin (FLOMAX) 0 4 mg Take night before surgery
165

## 2022-12-05 ENCOUNTER — ANESTHESIA EVENT (OUTPATIENT)
Dept: PERIOP | Facility: HOSPITAL | Age: 72
End: 2022-12-05

## 2022-12-05 ENCOUNTER — HOSPITAL ENCOUNTER (OUTPATIENT)
Facility: HOSPITAL | Age: 72
Setting detail: OUTPATIENT SURGERY
Discharge: HOME | End: 2022-12-05
Attending: UROLOGY | Admitting: UROLOGY

## 2022-12-05 ENCOUNTER — ANESTHESIA (OUTPATIENT)
Dept: PERIOP | Facility: HOSPITAL | Age: 72
End: 2022-12-05

## 2022-12-05 VITALS
RESPIRATION RATE: 18 BRPM | DIASTOLIC BLOOD PRESSURE: 81 MMHG | WEIGHT: 190 LBS | HEART RATE: 77 BPM | OXYGEN SATURATION: 97 % | HEIGHT: 69 IN | BODY MASS INDEX: 28.14 KG/M2 | TEMPERATURE: 97.4 F | SYSTOLIC BLOOD PRESSURE: 149 MMHG

## 2022-12-05 DIAGNOSIS — R97.20 ELEVATED PSA: ICD-10-CM

## 2022-12-05 RX ORDER — HYDROCODONE BITARTRATE AND ACETAMINOPHEN 5; 325 MG/1; MG/1
1 TABLET ORAL EVERY 6 HOURS PRN
Status: DISCONTINUED | OUTPATIENT
Start: 2022-12-05 | End: 2022-12-05 | Stop reason: HOSPADM

## 2022-12-05 RX ORDER — SODIUM CHLORIDE, SODIUM LACTATE, POTASSIUM CHLORIDE, CALCIUM CHLORIDE 600; 310; 30; 20 MG/100ML; MG/100ML; MG/100ML; MG/100ML
125 INJECTION, SOLUTION INTRAVENOUS CONTINUOUS
Status: CANCELLED | OUTPATIENT
Start: 2022-12-05

## 2022-12-05 RX ORDER — FENTANYL CITRATE/PF 50 MCG/ML
25 SYRINGE (ML) INJECTION
Status: DISCONTINUED | OUTPATIENT
Start: 2022-12-05 | End: 2022-12-05 | Stop reason: HOSPADM

## 2022-12-05 RX ORDER — CEFAZOLIN SODIUM 2 G/50ML
2000 SOLUTION INTRAVENOUS ONCE
Status: COMPLETED | OUTPATIENT
Start: 2022-12-05 | End: 2022-12-05

## 2022-12-05 RX ORDER — SODIUM CHLORIDE, SODIUM LACTATE, POTASSIUM CHLORIDE, CALCIUM CHLORIDE 600; 310; 30; 20 MG/100ML; MG/100ML; MG/100ML; MG/100ML
INJECTION, SOLUTION INTRAVENOUS CONTINUOUS PRN
Status: DISCONTINUED | OUTPATIENT
Start: 2022-12-05 | End: 2022-12-05

## 2022-12-05 RX ORDER — PROPOFOL 10 MG/ML
INJECTION, EMULSION INTRAVENOUS CONTINUOUS PRN
Status: DISCONTINUED | OUTPATIENT
Start: 2022-12-05 | End: 2022-12-05

## 2022-12-05 RX ORDER — ONDANSETRON 2 MG/ML
4 INJECTION INTRAMUSCULAR; INTRAVENOUS ONCE AS NEEDED
Status: DISCONTINUED | OUTPATIENT
Start: 2022-12-05 | End: 2022-12-05 | Stop reason: HOSPADM

## 2022-12-05 RX ORDER — FENTANYL CITRATE 50 UG/ML
INJECTION, SOLUTION INTRAMUSCULAR; INTRAVENOUS AS NEEDED
Status: DISCONTINUED | OUTPATIENT
Start: 2022-12-05 | End: 2022-12-05

## 2022-12-05 RX ORDER — MAGNESIUM HYDROXIDE 1200 MG/15ML
LIQUID ORAL AS NEEDED
Status: DISCONTINUED | OUTPATIENT
Start: 2022-12-05 | End: 2022-12-05 | Stop reason: HOSPADM

## 2022-12-05 RX ORDER — LIDOCAINE HYDROCHLORIDE 20 MG/ML
INJECTION, SOLUTION EPIDURAL; INFILTRATION; INTRACAUDAL; PERINEURAL AS NEEDED
Status: DISCONTINUED | OUTPATIENT
Start: 2022-12-05 | End: 2022-12-05 | Stop reason: HOSPADM

## 2022-12-05 RX ORDER — LIDOCAINE HYDROCHLORIDE 10 MG/ML
INJECTION, SOLUTION EPIDURAL; INFILTRATION; INTRACAUDAL; PERINEURAL AS NEEDED
Status: DISCONTINUED | OUTPATIENT
Start: 2022-12-05 | End: 2022-12-05

## 2022-12-05 RX ORDER — ONDANSETRON 2 MG/ML
INJECTION INTRAMUSCULAR; INTRAVENOUS AS NEEDED
Status: DISCONTINUED | OUTPATIENT
Start: 2022-12-05 | End: 2022-12-05

## 2022-12-05 RX ORDER — PROPOFOL 10 MG/ML
INJECTION, EMULSION INTRAVENOUS AS NEEDED
Status: DISCONTINUED | OUTPATIENT
Start: 2022-12-05 | End: 2022-12-05

## 2022-12-05 RX ADMIN — CEFAZOLIN SODIUM 2000 MG: 2 SOLUTION INTRAVENOUS at 12:59

## 2022-12-05 RX ADMIN — SODIUM CHLORIDE, SODIUM LACTATE, POTASSIUM CHLORIDE, AND CALCIUM CHLORIDE: .6; .31; .03; .02 INJECTION, SOLUTION INTRAVENOUS at 12:59

## 2022-12-05 RX ADMIN — FENTANYL CITRATE 25 MCG: 50 INJECTION, SOLUTION INTRAMUSCULAR; INTRAVENOUS at 13:09

## 2022-12-05 RX ADMIN — FENTANYL CITRATE 50 MCG: 50 INJECTION, SOLUTION INTRAMUSCULAR; INTRAVENOUS at 13:00

## 2022-12-05 RX ADMIN — PROPOFOL 50 MG: 10 INJECTION, EMULSION INTRAVENOUS at 13:04

## 2022-12-05 RX ADMIN — PROPOFOL 100 MCG/KG/MIN: 10 INJECTION, EMULSION INTRAVENOUS at 13:04

## 2022-12-05 RX ADMIN — ONDANSETRON 4 MG: 2 INJECTION INTRAMUSCULAR; INTRAVENOUS at 13:10

## 2022-12-05 RX ADMIN — LIDOCAINE HYDROCHLORIDE 50 MG: 10 INJECTION, SOLUTION EPIDURAL; INFILTRATION; INTRACAUDAL; PERINEURAL at 13:04

## 2022-12-05 RX ADMIN — FENTANYL CITRATE 25 MCG: 50 INJECTION, SOLUTION INTRAMUSCULAR; INTRAVENOUS at 13:17

## 2022-12-05 NOTE — H&P
[address] [place] [address]  Nurse Practitioner  Specialty:  Urology  Progress Notes      Attested  Encounter Date:  10/12/2022  H&P updated by me 12/5/2022        Attestation signed by Jose Luis Nicolas MD at 10/12/2022  3:11 PM     I was the appointed supervising/collaborating physician for this visit  I have not personally seen or examined the patient  I was available for questions/intervention if decided necessary by the advanced practice clinician  Expand All Collapse All  10/12/2022     No chief complaint on file         Assessment and Plan     71 y o  male new patient to office     1  Elevated PSA  • PSA trend  ? 6 6 (08/22/2022)  ? 5 2 (09/09/2021)  ? 5 5 (04/27/2018)  • Denies any family history of prostate cancer  • CHRISSY reveals smooth symmetric prostate, no palpable nodules or masses  • Negative prostate biopsy 7/2018 by Dr Hany Mensah  ? 11/12 zones BPH  ? Right apex revealed atypical hyperplasia with no evidence of prostate carcinoma  • Based on history of elevated PSA and prior biopsy results showing atypical hyperplasia in the right apex, I recommend a transperineal prostate biopsy  Patient is agreeable to this  • Technique, benefits, and risk of the procedure listed above were discussed with them today and informed written consent was obtained  All questions and concerns regarding surgery and perioperative expectations have been addressed and answered          2  BPH with LUTS  • Complaints of urinary frequency, nocturia, weak urinary stream, and incomplete emptying  • PVR today 65 mL  • Recommend trial of tamsulosin 0 4 mg daily  • Follow-up after prostate biopsy to reassess symptoms         History of Present Illness  Alivia Alcala is a 71 y o  male new patient to office  Here for evaluation of nocturia and elevated PSA      He had previously been seen by Dr Hany Mensah for elevated PSA and had a negative prostate biopsy in 2018   At the time of his last biopsy he states that Dr Hany Mensah told him, there was one area of the prostate he wanted to keep an eye on  He last saw Dr Waleska Aleman about 3 years ago  His most recent PSA is 6 6 as of August of 2022 previously 5 2 in September of 2021 and 5 5 in April of 2018  I was able to review Dr Palma Quiet office note from 7/17/2018 under media tab (7/18/2018 Consult Notes Ext)  His prostate biopsy from 2018 showed 11/12 zone with BPH, Right apex revealed atypical hyperplasia with no evidence of prostate carcinoma      He does complain of urinary frequency, nocturia 3 time per night, incomplete bladder emptying, and weak urinary stream  He reports his symptoms worsen with what he drinks specifically with coffee  He drinks about 3 cups of coffee per day and 2 glasses of water per day  Denies alcohol use  He is not currently on any alpha blockers for BPH              Review of Systems   Constitutional: Negative for chills and fever  HENT: Negative for congestion and sore throat  Respiratory: Negative for cough and shortness of breath  Cardiovascular: Negative for chest pain and leg swelling  Gastrointestinal: Negative for abdominal pain, constipation, diarrhea, nausea and vomiting  Genitourinary: Positive for difficulty urinating, frequency and urgency  Negative for dysuria and hematuria  Nocturia   Musculoskeletal: Negative for back pain and gait problem  Skin: Negative for wound  Allergic/Immunologic: Negative for immunocompromised state  Neurological: Negative for dizziness, weakness and numbness     Hematological: Does not bruise/bleed easily                    AUA SYMPTOM SCORE    Flowsheet Row Most Recent Value   AUA SYMPTOM SCORE     How often have you had a sensation of not emptying your bladder completely after you finished urinating? 5 (P)     How often have you had to urinate again less than two hours after you finished urinating? 4 (P)     How often have you found you stopped and started again several times when you urinate? 5 (P)     How often have you found it difficult to postpone urination? 1 (P)     How often have you had a weak urinary stream? 3 (P)     How often have you had to push or strain to begin urination? 1 (P)     How many times did you most typically get up to urinate from the time you went to bed at night until the time you got up in the morning? 5 (P)     Quality of Life: If you were to spend the rest of your life with your urinary condition just the way it is now, how would you feel about that? 4 (P)     AUA SYMPTOM SCORE 24 (P)              Vitals  Vitals   There were no vitals filed for this visit         Physical Exam  Vitals reviewed  Constitutional:       General: He is not in acute distress  Appearance: Normal appearance  He is not ill-appearing or toxic-appearing  HENT:      Head: Normocephalic and atraumatic  Eyes:      General: No scleral icterus  Conjunctiva/sclera: Conjunctivae normal    Cardiovascular:      Rate and Rhythm: Normal rate  Pulmonary:      Effort: Pulmonary effort is normal  No respiratory distress  Abdominal:      Palpations: Abdomen is soft  Tenderness: There is no abdominal tenderness  There is no right CVA tenderness or left CVA tenderness  Hernia: No hernia is present  Genitourinary:     Comments: CHRISSY reveals smooth symmetric prostate, no palpable nodules masses  Musculoskeletal:      Cervical back: Normal range of motion  Right lower leg: No edema  Left lower leg: No edema  Skin:     General: Skin is warm and dry  Coloration: Skin is not jaundiced or pale  Neurological:      General: No focal deficit present  Mental Status: He is alert and oriented to person, place, and time  Mental status is at baseline  Gait: Gait normal    Psychiatric:         Mood and Affect: Mood normal          Behavior: Behavior normal          Thought Content:  Thought content normal          Judgment: Judgment normal             Past History  Medical History   No past medical history on file  Social History   Social History            Socioeconomic History   • Marital status: /Civil Union       Spouse name: Not on file   • Number of children: Not on file   • Years of education: Not on file   • Highest education level: Not on file   Occupational History   • Not on file   Tobacco Use   • Smoking status: Never Smoker   • Smokeless tobacco: Never Used   • Tobacco comment: NO TOBACCO USE   Vaping Use   • Vaping Use: Never used   Substance and Sexual Activity   • Alcohol use: No   • Drug use: No   • Sexual activity: Not on file   Other Topics Concern   • Not on file   Social History Narrative     ALWAYS USES  SEAT BELT     CAFFEINE USE      Social Determinants of Health          Financial Resource Strain: Low Risk    • Difficulty of Paying Living Expenses: Not hard at all   Food Insecurity: Not on file   Transportation Needs: No Transportation Needs   • Lack of Transportation (Medical): No   • Lack of Transportation (Non-Medical):  No   Physical Activity: Not on file   Stress: Not on file   Social Connections: Not on file   Intimate Partner Violence: Not on file   Housing Stability: Not on file         Social History           Tobacco Use   Smoking Status Never Smoker   Smokeless Tobacco Never Used   Tobacco Comment     NO TOBACCO USE      Family History         Family History   Problem Relation Age of Onset   • Alzheimer's disease Mother     • Dementia Mother              The following portions of the patient's history were reviewed and updated as appropriate allergies, current medications, past medical history, past social history, past surgical history and problem list     Imaging:     Results  Recent Results   No results found for this or any previous visit (from the past 1 hour(s))    ]        Lab Results   Component Value Date     PSA 6 6 (H) 08/22/2022     PSA 5 2 (H) 09/09/2021     PSA 5 5 (H) 04/27/2018     PSA 4 8 03/17/2018            Lab Results   Component Value Date     GLUCOSE 84 03/18/2017     CALCIUM 9 0 08/22/2022      03/18/2017     K 4 3 08/22/2022     CO2 23 08/22/2022      08/22/2022     BUN 13 08/22/2022     CREATININE 0 75 08/22/2022            Lab Results   Component Value Date     WBC 4 7 03/17/2018     HGB 16 1 03/17/2018     HCT 45 3 03/17/2018     MCV 90 9 03/18/2017      03/17/2018         Please Note:  Voice dictation software has been used to create this document   There may be inadvertent transcriptions errors       Yanique Santoro      Electronically signed by XIOMY Gutierrez at 10/12/2022  2:41 PM   Electronically signed by Mely Wagner MD at 10/12/2022  3:11 PM      Office Visit on 10/12/2022     Office Visit on 10/12/2022        Revision History      Note shared with patient  Additional Documentation    Vitals:   /80   Pulse 76   Wt 88 9 kg (196 lb)   BMI 28 94 kg/m²   BSA 2 05 m²   Flowsheets:   Patient-Reported Data      SmartForms:    [facility] PRE-CHARTING •    [facility] PCMH/PCSP WRAP UP REQUIREMENTS ADVANCED      Encounter Info:   Billing Info,   History,   Allergies,   Detailed Report        Orders Placed       POCT Measure PVR     Case request operating room: TRANSPERINEAL ULTRASOUND GUIDED BIOPSY PROSTATE Once  Other Orders Performed     Ambulatory Referral to Urology Closed  Medication Changes       Tamsulosin HCl 0 4 mg Oral Daily with dinner       (Therapy completed)     Medication List     Visit Diagnoses       Elevated PSA     Benign prostatic hyperplasia with nocturia     Problem List

## 2022-12-05 NOTE — ANESTHESIA PREPROCEDURE EVALUATION
Procedure:  TRANSPERINEAL ULTRASOUND GUIDED BIOPSY PROSTATE (Bilateral: Perineum)    Relevant Problems   CARDIO   (+) Hyperlipidemia      MUSCULOSKELETAL   (+) Generalized osteoarthritis      NEURO/PSYCH   (+) Anxiety   (+) Reactive depression      Nervous and Auditory   (+) Acoustic neuroma (HCC)      Other   (+) Elevated prostate specific antigen (PSA)   (+) Impotence of organic origin             Anesthesia Plan  ASA Score- 2     Anesthesia Type- IV sedation with anesthesia with ASA Monitors  Additional Monitors:   Airway Plan:           Plan Factors-    Chart reviewed  Induction- intravenous  Postoperative Plan-     Informed Consent- Anesthetic plan and risks discussed with patient  I personally reviewed this patient with the CRNA  Discussed and agreed on the Anesthesia Plan with the CRNA  Israel Montague

## 2022-12-05 NOTE — OP NOTE
OPERATIVE REPORT  PATIENT NAME: Tod Mantilla    :  1950  MRN: [id number]  Pt Location: [facility] OR ROOM 02    SURGERY DATE: 2022    Surgeon(s) and Role:     Madeleine Singleton MD - Primary    Preop Diagnosis:  Elevated PSA [R97 20]    Post-Op Diagnosis Codes:     * Elevated PSA [R97 20]    Procedure(s) (LRB):  TRANSPERINEAL ULTRASOUND GUIDED BIOPSY PROSTATE (Bilateral)    Specimen(s):  ID Type Source Tests Collected by Time Destination   1 : right posterior medial Tissue Prostate TISSUE EXAM Yanelis Lee MD 2022 1307    2 : right posterior lateral Tissue Prostate TISSUE EXAM Yanelis Lee MD 2022 1307    3 : right anterior medial Tissue Prostate TISSUE EXAM Yanelis Lee MD 2022 1307    4 : right anterior lateral Tissue Prostate TISSUE EXAM Yanelis Lee MD 2022 1308    5 : right base Tissue Prostate TISSUE EXAM Yanelis Lee MD 2022 1310    6 : left posterior medial Tissue Prostate TISSUE EXAM Yanelis Lee MD 2022 1311    7 : left posterior lateral Tissue Prostate TISSUE EXAM Yanelis Lee MD 2022 1311    8 : left anterior medial Tissue Prostate TISSUE EXAM Yanelis Lee MD 2022 1311    9 : left anterior lateral Tissue Prostate TISSUE EXAM Yanelis Lee MD 2022 1311    10 : left base Tissue Prostate TISSUE EXAM Yanelis Lee MD 2022 1311        Estimated Blood Loss:   Minimal    Drains:  * No LDAs found *    Anesthesia Type:   General    Operative Indications:  Elevated PSA [R97 20]      Operative Findings:  43 cc gland, PSA density 0 15 non g per mL per mL  Height 3 4 cm, width 4 6 cm, length 4 2 cm  No overt abnormalities  Approximately 18 cores taken  Complications:   None    Procedure and Technique:  The patient is  is here for transperineal prostate biopsy guided by biplanar transrectal ultrasound for elevated PSA of 6 6 as of 2022  Andrew Po He has not had an MRI  He had negative biopsy by Dr Graff Stands 2018   However the prior biopsy did show atypical hyperplasia and the right apex  He also has BPH with lower urinary tract symptoms with urinary frequency nocturia weak urinary stream incomplete emptying PVR 65 cc in the office  The procedure, as well as the risks of bleeding, infection, and urinary retention have been explained he gives informed consent  The patient was brought to the operating room and identified properly  IV sedation was induced, and he was placed in the dorsal lithotomy position  The perineum was shaved, a ChloraPrep scrub was used of the perineum and anal regions, and dried  A large Tegaderm was used to tape the scrotum in a cranial direction to keep it out of the way the perineum  A time-out was performed  Care was taken when placing the patient in the dorsal lithotomy position to make sure all pressure points were protected     I then prepared the biplanar ultrasound probe with ultrasound gel covering mostly the distal sensor, and covered it with a condom  The precision Point kit was secured to this  The condom was secured , and I placed the transrectal ultrasound probe into the rectum and visualized the prostate in sagittal and transverse views  This was used with a split screen  Volumetric measurements showed  43 cc gland  The perineum was anesthetized with 2% xylocaine 10 cc both superficially and deep with a spinal needle on both sides of the median raphe through the central aperture of the precision Point kit  I then placed the introducer needle through the upper apertures to start taking the anterior portions of the peripheral zone of the prostate  Samples were taken right anterior medial, right anterior lateral, then left anterior medial left anterior lateral   The biopsy needle was dipped in sterile water in between each biopsy  I then switched to the lower apertures and performed bilateral posterior lateral zone, posterior medial zone and base biopsies       These were all peripheral zone biopsies    Extra biopsies were taken in zones where the initial biopsy was suboptimal tissue  Care was taken to try to include the entire length of the prostate as much as possible for complete coverage  Excellent prostate tissue cores were obtained, and specimens were sent to pathology  I withdrew the guide needle and I held pressure on the perineum for 1 minutes using a folded towel  The perineum was then cleansed with sterile water     I was present for the entire procedure and A qualified resident physician was not available    Patient Disposition:  PACU  and hemodynamically stable        SIGNATURE: Arnoldo Cruz MD  DATE: December 5, 2022  TIME: 1:39 PM

## 2022-12-05 NOTE — ANESTHESIA POSTPROCEDURE EVALUATION
Post-Op Assessment Note    CV Status:  Stable    Pain management: adequate     Mental Status:  Alert and awake   Hydration Status:  Euvolemic   PONV Controlled:  Controlled   Airway Patency:  Patent      Post Op Vitals Reviewed: Yes      Staff: CRNA         No notable events documented      BP   157/81   Temp     Pulse  80   Resp   18   SpO2   98%

## 2022-12-06 ENCOUNTER — TELEPHONE (OUTPATIENT)
Dept: UROLOGY | Facility: CLINIC | Age: 72
End: 2022-12-06

## 2022-12-06 NOTE — TELEPHONE ENCOUNTER
Post Op Note    Madeleine Gaines is a 71 y o  male s/p TRANSPERINEAL ULTRASOUND GUIDED BIOPSY PROSTATE (Bilateral: Perineum) - 4 6w x 3 4h x 4 2l performed 12/5/2022 by Dr Stephanie Sapp  How would you rate your pain on a scale from 1 to 10, 10 being the worst pain ever? 0  Have you had a fever? No    Do you have any difficulty urinating? No    Do you have any other questions or concerns that I can address at this time? Patient reports doing well s/p biopsy  He denies pain, fevers, or chills  No difficulty urinating  Patient reports a very small amount of blood in the urine  Advised this is normal and to hydrate well with water  Confirmed upcoming appointment with Dr Stephanie Sapp on 12/20/22

## 2022-12-09 ENCOUNTER — TELEPHONE (OUTPATIENT)
Dept: UROLOGY | Facility: CLINIC | Age: 72
End: 2022-12-09

## 2022-12-09 DIAGNOSIS — R97.20 ELEVATED PSA: Primary | ICD-10-CM

## 2022-12-09 NOTE — TELEPHONE ENCOUNTER
----- Message from iLzeth Singh MD sent at 12/9/2022  2:25 PM EST -----  Pt notified, good news, benign-see me in 6 month with PSA

## 2023-02-06 DIAGNOSIS — E78.5 HYPERLIPIDEMIA, UNSPECIFIED HYPERLIPIDEMIA TYPE: ICD-10-CM

## 2023-02-06 DIAGNOSIS — F41.9 ANXIETY: ICD-10-CM

## 2023-02-06 RX ORDER — SIMVASTATIN 20 MG
TABLET ORAL
Qty: 90 TABLET | Refills: 3 | Status: SHIPPED | OUTPATIENT
Start: 2023-02-06

## 2023-02-06 RX ORDER — SERTRALINE HYDROCHLORIDE 25 MG/1
25 TABLET, FILM COATED ORAL DAILY
Qty: 90 TABLET | Refills: 3 | Status: SHIPPED | OUTPATIENT
Start: 2023-02-06

## 2023-02-28 ENCOUNTER — OFFICE VISIT (OUTPATIENT)
Dept: FAMILY MEDICINE CLINIC | Facility: CLINIC | Age: 73
End: 2023-02-28

## 2023-02-28 VITALS — HEIGHT: 69 IN | WEIGHT: 196.6 LBS | BODY MASS INDEX: 29.12 KG/M2 | TEMPERATURE: 97.6 F

## 2023-02-28 DIAGNOSIS — F41.9 ANXIETY: ICD-10-CM

## 2023-02-28 DIAGNOSIS — D33.3 ACOUSTIC NEUROMA (HCC): ICD-10-CM

## 2023-02-28 DIAGNOSIS — R97.20 ELEVATED PROSTATE SPECIFIC ANTIGEN (PSA): ICD-10-CM

## 2023-02-28 DIAGNOSIS — E78.2 MIXED HYPERLIPIDEMIA: Primary | ICD-10-CM

## 2023-02-28 RX ORDER — ALPRAZOLAM 0.5 MG/1
0.5 TABLET ORAL
Qty: 15 TABLET | Refills: 0 | Status: SHIPPED | OUTPATIENT
Start: 2023-02-28

## 2023-02-28 NOTE — PROGRESS NOTES
Name: Linnea Crockett      : 1950      MRN: [id number]  Encounter Provider: Tiki Cano DO  Encounter Date: 2023   Encounter department: [address]     1  Mixed hyperlipidemia  -     Lipid panel; Future  -     Comprehensive metabolic panel; Future  Low fat diet and walking program    2  Anxiety  -     ALPRAZolam (XANAX) 0 5 mg tablet; Take 1 tablet (0 5 mg total) by mouth daily at bedtime as needed for anxiety    3  Acoustic neuroma (HCC)  Pt followed every few years with [facility] but has been stable   4  Elevated prostate specific antigen (PSA)  Pt is following with urology and had recent bx has repeat PSa ordered and will see Dr Talita Kaur in       BMI Counseling: Body mass index is 29 03 kg/m²  The BMI is above normal  Nutrition recommendations include consuming healthier snacks and increasing intake of lean protein  Exercise recommendations include exercising 3-5 times per week  Rationale for BMI follow-up plan is due to patient being overweight or obese  Subjective      HPI   Pt generally well He does take the alprazolam intermittingly but had #15 since last visit He does feel he would use more often if he had but managing he said increasing the sertraline made him too drowsy No chest pain or sob He walks in [place] almost daily depending on the weather and feels well doing so He did see Dr Radha Arriaga and had prostate bx no new sxs and has followup in  He javier like his ears checked - he uses debrox intermittingly for wax clearance Hearing about the same   Review of Systems   Constitutional: Negative for chills and fever  HENT: Negative  Eyes: Negative for visual disturbance  Respiratory: Negative for cough and shortness of breath  Cardiovascular: Negative  Gastrointestinal: Negative  Genitourinary: Positive for frequency  Negative for difficulty urinating and flank pain  Musculoskeletal: Negative      Neurological: Negative for dizziness, light-headedness and numbness  Psychiatric/Behavioral: Negative for sleep disturbance  The patient is not nervous/anxious  Current Outpatient Medications on File Prior to Visit   Medication Sig   • Cholecalciferol (VITAMIN D3) 2000 units TABS Take 1,000 Units by mouth daily   • Multiple Vitamin (MULTI VITAMIN DAILY PO) Take 1 tablet by mouth daily   • sertraline (ZOLOFT) 25 mg tablet TAKE 1 TABLET (25 MG TOTAL) BY MOUTH DAILY  • simvastatin (ZOCOR) 20 mg tablet TAKE 1 TABLET BY MOUTH EVERY DAY   • tamsulosin (FLOMAX) 0 4 mg TAKE 1 CAPSULE BY MOUTH EVERY DAY WITH DINNER   • [DISCONTINUED] ALPRAZolam (XANAX) 0 5 mg tablet Take 1 tablet (0 5 mg total) by mouth daily at bedtime as needed for anxiety   • Naproxen Sodium 220 MG CAPS Take by mouth (Patient not taking: Reported on 2/28/2023)   • [DISCONTINUED] ofloxacin (FLOXIN) 0 3 % otic solution Administer 10 drops to the right ear 2 (two) times a day       Objective     Temp 97 6 °F (36 4 °C) (Temporal)   Ht 5' 9" (1 753 m)   Wt 89 2 kg (196 lb 9 6 oz)   BMI 29 03 kg/m²     Physical Exam  Vitals reviewed  Constitutional:       General: He is not in acute distress  Appearance: Normal appearance  He is not ill-appearing, toxic-appearing or diaphoretic  HENT:      Head: Normocephalic and atraumatic  Right Ear: Tympanic membrane, ear canal and external ear normal       Left Ear: Tympanic membrane, ear canal and external ear normal       Nose: Nose normal       Mouth/Throat:      Mouth: Mucous membranes are dry  Eyes:      General: No scleral icterus  Extraocular Movements: Extraocular movements intact  Conjunctiva/sclera: Conjunctivae normal       Pupils: Pupils are equal, round, and reactive to light  Cardiovascular:      Rate and Rhythm: Normal rate and regular rhythm  Pulses: Normal pulses  Pulmonary:      Effort: Pulmonary effort is normal  No respiratory distress  Breath sounds: Normal breath sounds   No wheezing  Abdominal:      General: Bowel sounds are normal  There is no distension  Palpations: Abdomen is soft  Tenderness: There is no abdominal tenderness  Musculoskeletal:      Cervical back: Normal range of motion and neck supple  No rigidity  Right lower leg: No edema  Left lower leg: No edema  Lymphadenopathy:      Cervical: No cervical adenopathy  Skin:     General: Skin is dry  Coloration: Skin is not jaundiced or pale  Neurological:      General: No focal deficit present  Mental Status: He is alert and oriented to person, place, and time  Mental status is at baseline  Psychiatric:         Mood and Affect: Mood normal          Behavior: Behavior normal          Thought Content:  Thought content normal          Judgment: Judgment normal        Lynda Miguel DO

## 2023-06-12 ENCOUNTER — APPOINTMENT (OUTPATIENT)
Dept: LAB | Facility: CLINIC | Age: 73
End: 2023-06-12
Payer: MEDICARE

## 2023-06-12 DIAGNOSIS — E78.2 MIXED HYPERLIPIDEMIA: ICD-10-CM

## 2023-06-12 DIAGNOSIS — R97.20 ELEVATED PSA: ICD-10-CM

## 2023-06-12 LAB
ALBUMIN SERPL BCP-MCNC: 3.6 G/DL (ref 3.5–5)
ALP SERPL-CCNC: 75 U/L (ref 46–116)
ALT SERPL W P-5'-P-CCNC: 38 U/L (ref 12–78)
ANION GAP SERPL CALCULATED.3IONS-SCNC: 2 MMOL/L (ref 4–13)
AST SERPL W P-5'-P-CCNC: 21 U/L (ref 5–45)
BILIRUB SERPL-MCNC: 0.52 MG/DL (ref 0.2–1)
BUN SERPL-MCNC: 16 MG/DL (ref 5–25)
CALCIUM SERPL-MCNC: 9.6 MG/DL (ref 8.3–10.1)
CHLORIDE SERPL-SCNC: 110 MMOL/L (ref 96–108)
CHOLEST SERPL-MCNC: 203 MG/DL
CO2 SERPL-SCNC: 27 MMOL/L (ref 21–32)
CREAT SERPL-MCNC: 0.86 MG/DL (ref 0.6–1.3)
GFR SERPL CREATININE-BSD FRML MDRD: 87 ML/MIN/1.73SQ M
GLUCOSE P FAST SERPL-MCNC: 92 MG/DL (ref 65–99)
HDLC SERPL-MCNC: 66 MG/DL
LDLC SERPL CALC-MCNC: 106 MG/DL (ref 0–100)
NONHDLC SERPL-MCNC: 137 MG/DL
POTASSIUM SERPL-SCNC: 4.4 MMOL/L (ref 3.5–5.3)
PROT SERPL-MCNC: 7.3 G/DL (ref 6.4–8.4)
PSA SERPL-MCNC: 6.74 NG/ML (ref 0–4)
SODIUM SERPL-SCNC: 139 MMOL/L (ref 135–147)
TRIGL SERPL-MCNC: 156 MG/DL

## 2023-06-12 PROCEDURE — 80061 LIPID PANEL: CPT

## 2023-06-12 PROCEDURE — 84153 ASSAY OF PSA TOTAL: CPT

## 2023-06-12 PROCEDURE — 36415 COLL VENOUS BLD VENIPUNCTURE: CPT

## 2023-06-12 PROCEDURE — 80053 COMPREHEN METABOLIC PANEL: CPT

## 2023-06-21 ENCOUNTER — OFFICE VISIT (OUTPATIENT)
Dept: UROLOGY | Facility: CLINIC | Age: 73
End: 2023-06-21
Payer: MEDICARE

## 2023-06-21 VITALS
SYSTOLIC BLOOD PRESSURE: 132 MMHG | HEIGHT: 69 IN | DIASTOLIC BLOOD PRESSURE: 88 MMHG | WEIGHT: 195 LBS | BODY MASS INDEX: 28.88 KG/M2

## 2023-06-21 DIAGNOSIS — R97.20 ELEVATED PSA: ICD-10-CM

## 2023-06-21 DIAGNOSIS — N40.1 BENIGN PROSTATIC HYPERPLASIA WITH NOCTURIA: Primary | ICD-10-CM

## 2023-06-21 DIAGNOSIS — R35.1 BENIGN PROSTATIC HYPERPLASIA WITH NOCTURIA: Primary | ICD-10-CM

## 2023-06-21 PROCEDURE — 99213 OFFICE O/P EST LOW 20 MIN: CPT | Performed by: UROLOGY

## 2023-06-21 RX ORDER — FINASTERIDE 5 MG/1
5 TABLET, FILM COATED ORAL DAILY
Qty: 90 TABLET | Refills: 3 | Status: SHIPPED | OUTPATIENT
Start: 2023-06-21

## 2023-06-21 NOTE — LETTER
2023     Frances Berman DO  [address], [address]    Patient: Fredy Carvajal   YOB: 1950   Date of Visit: 2023       Dear Dr Marylene Flesher: Thank you for referring Fredy Carvajal to me for evaluation  Below are my notes for this consultation  If you have questions, please do not hesitate to call me  I look forward to following your patient along with you  Sincerely,        Leticia Mirza MD        CC: No Recipients    Leticia Mirza MD  2023  3:27 PM  Sign when Signing Visit  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: Start finasteride in addition to the Flomax he is already on  Hopefully his PSA will drop by greater than 50%  Chief Complaint   No chief complaint on file  History of Present Illness   Follow-up elevated PSA, status post negative transperineal prostate biopsy by me 2022   43 cc gland, PSA density 0 15  He has not had an MRI  PSA was 6 6 as of 2022  Previous biopsy by Dr Rain Escobar in 2018 was also negative  Also has history of BPH with lower urinary tract symptoms with urinary frequency nocturia and weak urinary stream and incomplete bladder emptying with a PVR of 65 cc in the office in the past   Creatinine normal 2023 at 0 86, and PSA is basically stable at 6 74 2023,  6 6 as described above, 5 2 in 2021  As far as his voiding goes, he can live with the symptoms    He does have some "intermittency of stream and the symptoms described above  I think he would benefit from finasteride and that I think he would feel like he empties his bladder better have less intermittency of stream   Side effects of sexual dysfunction is not an issue  We will also reduce his PSA and if it reduces it greater than 50% then we can be confident in saying that he does not have prostate cancer  The following portions of the patient's history were reviewed and updated as appropriate: allergies, current medications, past family history, past medical history, past social history, past surgical history and problem list   Past Medical History:   Diagnosis Date   • Hyperlipidemia    • PONV (postoperative nausea and vomiting)      Past Surgical History:   Procedure Laterality Date   • ANKLE SURGERY     • DE BX PROSTATE STRTCTC SATURATION SAMPLING IMG GID Bilateral 12/5/2022    Procedure: TRANSPERINEAL ULTRASOUND GUIDED BIOPSY PROSTATE;  Surgeon: Winnie Liu MD;  Location: [facility];  Service: Urology   • WISDOM TOOTH EXTRACTION     • WRIST SURGERY       shoulder  Review of Systems   Review of Systems   Genitourinary:        As per HPI       Active Problem List     Patient Active Problem List   Diagnosis   • Acoustic neuroma ([facility] [address] )   • Anxiety   • Elevated prostate specific antigen (PSA)   • Generalized osteoarthritis   • Hyperlipidemia   • Reactive depression   • Impotence of organic origin       Objective   /88 (BP Location: Left arm, Patient Position: Sitting, Cuff Size: Adult)   Ht 5' 9\" (1 753 m)   Wt 88 5 kg (195 lb)   BMI 28 80 kg/m²     Physical Exam  Vitals reviewed  Constitutional:       Appearance: Normal appearance  HENT:      Head: Normocephalic and atraumatic  Eyes:      Extraocular Movements: Extraocular movements intact  Pulmonary:      Effort: Pulmonary effort is normal    Musculoskeletal:         General: Normal range of motion  Cervical back: Normal range of motion     Skin:     " Coloration: Skin is not jaundiced or pale  Neurological:      General: No focal deficit present  Mental Status: He is alert and oriented to person, place, and time  Mental status is at baseline  Psychiatric:         Mood and Affect: Mood normal          Behavior: Behavior normal          Thought Content: Thought content normal          Judgment: Judgment normal              Current Medications     Current Outpatient Medications:   •  ALPRAZolam (XANAX) 0 5 mg tablet, Take 1 tablet (0 5 mg total) by mouth daily at bedtime as needed for anxiety, Disp: 15 tablet, Rfl: 0  •  Cholecalciferol (VITAMIN D3) 2000 units TABS, Take 1,000 Units by mouth daily, Disp: , Rfl:   •  finasteride (PROSCAR) 5 mg tablet, Take 1 tablet (5 mg total) by mouth daily, Disp: 90 tablet, Rfl: 3  •  sertraline (ZOLOFT) 25 mg tablet, TAKE 1 TABLET (25 MG TOTAL) BY MOUTH DAILY  , Disp: 90 tablet, Rfl: 3  •  simvastatin (ZOCOR) 20 mg tablet, TAKE 1 TABLET BY MOUTH EVERY DAY, Disp: 90 tablet, Rfl: 3  •  tamsulosin (FLOMAX) 0 4 mg, TAKE 1 CAPSULE BY MOUTH EVERY DAY WITH DINNER, Disp: 90 capsule, Rfl: 2  •  Multiple Vitamin (MULTI VITAMIN DAILY PO), Take 1 tablet by mouth daily, Disp: , Rfl:   •  Naproxen Sodium 220 MG CAPS, Take by mouth (Patient not taking: Reported on 2/28/2023), Disp: , Rfl:         Marilee Liz MD

## 2023-06-21 NOTE — PROGRESS NOTES
[facility]  [facility]  [address] [facility]  [place], [address] [facility]  [phone]  www  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: Start finasteride in addition to the Flomax he is already on  Hopefully his PSA will drop by greater than 50%  Chief Complaint   No chief complaint on file  History of Present Illness   Follow-up elevated PSA, status post negative transperineal prostate biopsy by me 2022   43 cc gland, PSA density 0 15  He has not had an MRI  PSA was 6 6 as of 2022  Previous biopsy by Dr Yamilet Riley in 2018 was also negative  Also has history of BPH with lower urinary tract symptoms with urinary frequency nocturia and weak urinary stream and incomplete bladder emptying with a PVR of 65 cc in the office in the past   Creatinine normal 2023 at 0 86, and PSA is basically stable at 6 74 2023,  6 6 as described above, 5 2 in 2021  As far as his voiding goes, he can live with the symptoms  He does have some intermittency of stream and the symptoms described above  I think he would benefit from finasteride and that I think he would feel like he empties his bladder better have less intermittency of stream   Side effects of sexual dysfunction is not an issue  We will also reduce his PSA and if it reduces it greater than 50% then we can be confident in saying that he does not have prostate cancer        The following portions of the patient's history were reviewed and updated as appropriate: allergies, current medications, past family "history, past medical history, past social history, past surgical history and problem list   Past Medical History:   Diagnosis Date   • Hyperlipidemia    • PONV (postoperative nausea and vomiting)      Past Surgical History:   Procedure Laterality Date   • ANKLE SURGERY     • NV BX PROSTATE STRTCTC SATURATION SAMPLING IMG GID Bilateral 12/5/2022    Procedure: TRANSPERINEAL ULTRASOUND GUIDED BIOPSY PROSTATE;  Surgeon: Leticia Mirza MD;  Location: [facility];  Service: Urology   • WISDOM TOOTH EXTRACTION     • WRIST SURGERY       shoulder  Review of Systems   Review of Systems   Genitourinary:        As per HPI       Active Problem List     Patient Active Problem List   Diagnosis   • Acoustic neuroma ([facility] [address] )   • Anxiety   • Elevated prostate specific antigen (PSA)   • Generalized osteoarthritis   • Hyperlipidemia   • Reactive depression   • Impotence of organic origin       Objective   /88 (BP Location: Left arm, Patient Position: Sitting, Cuff Size: Adult)   Ht 5' 9\" (1 753 m)   Wt 88 5 kg (195 lb)   BMI 28 80 kg/m²     Physical Exam  Vitals reviewed  Constitutional:       Appearance: Normal appearance  HENT:      Head: Normocephalic and atraumatic  Eyes:      Extraocular Movements: Extraocular movements intact  Pulmonary:      Effort: Pulmonary effort is normal    Musculoskeletal:         General: Normal range of motion  Cervical back: Normal range of motion  Skin:     Coloration: Skin is not jaundiced or pale  Neurological:      General: No focal deficit present  Mental Status: He is alert and oriented to person, place, and time  Mental status is at baseline  Psychiatric:         Mood and Affect: Mood normal          Behavior: Behavior normal          Thought Content:  Thought content normal          Judgment: Judgment normal              Current Medications     Current Outpatient Medications:   •  ALPRAZolam (XANAX) 0 5 mg tablet, Take 1 tablet (0 5 mg total) by mouth daily at bedtime " as needed for anxiety, Disp: 15 tablet, Rfl: 0  •  Cholecalciferol (VITAMIN D3) 2000 units TABS, Take 1,000 Units by mouth daily, Disp: , Rfl:   •  finasteride (PROSCAR) 5 mg tablet, Take 1 tablet (5 mg total) by mouth daily, Disp: 90 tablet, Rfl: 3  •  sertraline (ZOLOFT) 25 mg tablet, TAKE 1 TABLET (25 MG TOTAL) BY MOUTH DAILY  , Disp: 90 tablet, Rfl: 3  •  simvastatin (ZOCOR) 20 mg tablet, TAKE 1 TABLET BY MOUTH EVERY DAY, Disp: 90 tablet, Rfl: 3  •  tamsulosin (FLOMAX) 0 4 mg, TAKE 1 CAPSULE BY MOUTH EVERY DAY WITH DINNER, Disp: 90 capsule, Rfl: 2  •  Multiple Vitamin (MULTI VITAMIN DAILY PO), Take 1 tablet by mouth daily, Disp: , Rfl:   •  Naproxen Sodium 220 MG CAPS, Take by mouth (Patient not taking: Reported on 2/28/2023), Disp: , Rfl:         Karley Rice MD

## 2023-07-13 ENCOUNTER — TELEPHONE (OUTPATIENT)
Dept: FAMILY MEDICINE CLINIC | Facility: CLINIC | Age: 73
End: 2023-07-13

## 2023-07-25 ENCOUNTER — CONSULT (OUTPATIENT)
Dept: FAMILY MEDICINE CLINIC | Facility: CLINIC | Age: 73
End: 2023-07-25
Payer: MEDICARE

## 2023-07-25 VITALS
TEMPERATURE: 97.2 F | RESPIRATION RATE: 18 BRPM | BODY MASS INDEX: 29.12 KG/M2 | WEIGHT: 196.6 LBS | SYSTOLIC BLOOD PRESSURE: 126 MMHG | HEART RATE: 70 BPM | HEIGHT: 69 IN | DIASTOLIC BLOOD PRESSURE: 68 MMHG

## 2023-07-25 DIAGNOSIS — M15.9 GENERALIZED OSTEOARTHRITIS: ICD-10-CM

## 2023-07-25 DIAGNOSIS — F32.9 REACTIVE DEPRESSION: ICD-10-CM

## 2023-07-25 DIAGNOSIS — H25.013 CORTICAL AGE-RELATED CATARACT OF BOTH EYES: Primary | ICD-10-CM

## 2023-07-25 PROCEDURE — 99214 OFFICE O/P EST MOD 30 MIN: CPT | Performed by: INTERNAL MEDICINE

## 2023-07-25 RX ORDER — OFLOXACIN 3 MG/ML
SOLUTION/ DROPS OPHTHALMIC
COMMUNITY
Start: 2023-07-23

## 2023-07-25 RX ORDER — PREDNISOLONE ACETATE 10 MG/ML
SUSPENSION/ DROPS OPHTHALMIC
COMMUNITY
Start: 2023-07-18

## 2023-07-25 NOTE — PROGRESS NOTES
Name: Dayan Valentine      : 1950      MRN: [id number]  Encounter Provider: Tyrone William DO  Encounter Date: 2023   Encounter department: [address]     1. Cortical age-related cataract of both eyes  Cleared for cataract surgery Has eye drops and ride for procedure   2. Generalized osteoarthritis  Pt does walk for exercise but limited due to warm temps recently     3. Reactive depression  Stable on current rx    Move appt for next months to 6 month interval/awv  Flu shot when available and due for updated tdap        6months/awv    Subjective      HPI   Pt for cataract b/l  and  He has noted some decrease in focus He has eye drops and his wife will drive him day of surgeries No chest pain or sob He is walking as the weather allows No acute issues   Review of Systems   Constitutional: Negative for chills and fever. HENT: Positive for hearing loss. B/l hearing aides are working well    Eyes: Negative for visual disturbance. Respiratory: Negative for cough and shortness of breath. Cardiovascular: Negative for chest pain, palpitations and leg swelling. Gastrointestinal: Negative for abdominal distention and abdominal pain. Genitourinary: Negative. Musculoskeletal: Negative. Neurological: Negative for dizziness, light-headedness and headaches. Psychiatric/Behavioral: Negative for sleep disturbance. The patient is not nervous/anxious.         Current Outpatient Medications on File Prior to Visit   Medication Sig   • ALPRAZolam (XANAX) 0.5 mg tablet Take 1 tablet (0.5 mg total) by mouth daily at bedtime as needed for anxiety   • Cholecalciferol (VITAMIN D3) 2000 units TABS Take 1,000 Units by mouth daily   • finasteride (PROSCAR) 5 mg tablet Take 1 tablet (5 mg total) by mouth daily   • Multiple Vitamin (MULTI VITAMIN DAILY PO) Take 1 tablet by mouth daily   • ofloxacin (OCUFLOX) 0.3 % ophthalmic solution    • prednisoLONE acetate (PRED FORTE) 1 % ophthalmic suspension PUT 1 DROP INTO RIGHT EYE EVERY 2 HOURS. START AFTER SURGERY COMPLETED. • sertraline (ZOLOFT) 25 mg tablet TAKE 1 TABLET (25 MG TOTAL) BY MOUTH DAILY. • simvastatin (ZOCOR) 20 mg tablet TAKE 1 TABLET BY MOUTH EVERY DAY   • tamsulosin (FLOMAX) 0.4 mg TAKE 1 CAPSULE BY MOUTH EVERY DAY WITH DINNER   • [DISCONTINUED] Naproxen Sodium 220 MG CAPS Take by mouth (Patient not taking: Reported on 2/28/2023)       Objective     /68   Pulse 70   Temp (!) 97.2 °F (36.2 °C) (Temporal)   Resp 18   Ht 5' 9" (1.753 m)   Wt 89.2 kg (196 lb 9.6 oz)   BMI 29.03 kg/m²     Physical Exam  Vitals reviewed. Constitutional:       General: He is not in acute distress. Appearance: Normal appearance. He is not ill-appearing, toxic-appearing or diaphoretic. HENT:      Head: Normocephalic and atraumatic. Right Ear: External ear normal.      Left Ear: External ear normal.      Nose: Nose normal.      Mouth/Throat:      Mouth: Mucous membranes are moist.   Eyes:      General: No scleral icterus. Extraocular Movements: Extraocular movements intact. Conjunctiva/sclera: Conjunctivae normal.      Pupils: Pupils are equal, round, and reactive to light. Cardiovascular:      Rate and Rhythm: Normal rate and regular rhythm. Pulses: Normal pulses. Heart sounds: Normal heart sounds. Pulmonary:      Effort: Pulmonary effort is normal. No respiratory distress. Breath sounds: Normal breath sounds. No wheezing. Abdominal:      General: Bowel sounds are normal. There is no distension. Palpations: Abdomen is soft. There is no mass. Musculoskeletal:      Cervical back: Normal range of motion and neck supple. Right lower leg: No edema. Left lower leg: No edema. Lymphadenopathy:      Cervical: No cervical adenopathy. Skin:     General: Skin is warm and dry. Coloration: Skin is not jaundiced or pale.    Neurological:      General: No focal deficit present. Mental Status: He is alert and oriented to person, place, and time. Mental status is at baseline. Cranial Nerves: No cranial nerve deficit. Sensory: No sensory deficit. Psychiatric:         Mood and Affect: Mood normal.         Behavior: Behavior normal.         Thought Content:  Thought content normal.         Judgment: Judgment normal.       Demetrio Conway DO

## 2023-07-28 DIAGNOSIS — N40.1 BENIGN PROSTATIC HYPERPLASIA WITH NOCTURIA: ICD-10-CM

## 2023-07-28 DIAGNOSIS — R35.1 BENIGN PROSTATIC HYPERPLASIA WITH NOCTURIA: ICD-10-CM

## 2023-07-28 RX ORDER — TAMSULOSIN HYDROCHLORIDE 0.4 MG/1
CAPSULE ORAL
Qty: 90 CAPSULE | Refills: 2 | Status: SHIPPED | OUTPATIENT
Start: 2023-07-28

## 2023-08-10 DIAGNOSIS — F41.9 ANXIETY: ICD-10-CM

## 2023-08-10 RX ORDER — ALPRAZOLAM 0.5 MG/1
0.5 TABLET ORAL
Qty: 15 TABLET | Refills: 0 | Status: SHIPPED | OUTPATIENT
Start: 2023-08-10

## 2023-12-22 ENCOUNTER — APPOINTMENT (OUTPATIENT)
Dept: LAB | Facility: CLINIC | Age: 73
End: 2023-12-22
Payer: MEDICARE

## 2023-12-22 DIAGNOSIS — R97.20 ELEVATED PSA: ICD-10-CM

## 2023-12-22 PROCEDURE — 36415 COLL VENOUS BLD VENIPUNCTURE: CPT

## 2023-12-22 PROCEDURE — 84153 ASSAY OF PSA TOTAL: CPT

## 2023-12-22 PROCEDURE — 84154 ASSAY OF PSA FREE: CPT

## 2023-12-23 LAB
PSA FREE MFR SERPL: 16.8 %
PSA FREE SERPL-MCNC: 0.67 NG/ML
PSA SERPL-MCNC: 4 NG/ML (ref 0–4)

## 2024-01-04 ENCOUNTER — OFFICE VISIT (OUTPATIENT)
Dept: UROLOGY | Facility: CLINIC | Age: 74
End: 2024-01-04
Payer: MEDICARE

## 2024-01-04 VITALS
WEIGHT: 198.8 LBS | BODY MASS INDEX: 29.44 KG/M2 | DIASTOLIC BLOOD PRESSURE: 88 MMHG | SYSTOLIC BLOOD PRESSURE: 146 MMHG | OXYGEN SATURATION: 94 % | HEIGHT: 69 IN | HEART RATE: 81 BPM

## 2024-01-04 DIAGNOSIS — N40.1 BENIGN PROSTATIC HYPERPLASIA WITH NOCTURIA: ICD-10-CM

## 2024-01-04 DIAGNOSIS — R97.20 ELEVATED PSA: Primary | ICD-10-CM

## 2024-01-04 DIAGNOSIS — R35.1 BENIGN PROSTATIC HYPERPLASIA WITH NOCTURIA: ICD-10-CM

## 2024-01-04 PROCEDURE — 99213 OFFICE O/P EST LOW 20 MIN: CPT | Performed by: UROLOGY

## 2024-01-04 NOTE — PATIENT INSTRUCTIONS
We will check the PSA again in 6 months.  If the PSA has not dropped greater than 50%, we will get a multiparametric MRI of the prostate.

## 2024-01-04 NOTE — PROGRESS NOTES
UROLOGY PROGRESS NOTE   [facility]  [address]  [address]  [place] [address]  [phone]  Fax:[phone]  [email or web address]      NAME: Morteza Garcia  AGE: 73 y.o. SEX: male  : 1950   MRN: [id number]    DATE: 2024  TIME: 2:27 PM    Assessment and Plan:    Elevated PSA, now down to 4.0 from his usual 6 range on finasteride.  However this is not a greater than 50% drop.  For this reason we will check another PSA in 6 months and if it is still less than 50% drop, we will do a multiparametric MRI of the prostate.  He has already had 2 negative biopsies but no previous MRI.  Follow-up in 6 months with a PSA.    BPH with obstruction with nocturia decreased urinary stream urgency and frequency: Has had satisfactory improvement with finasteride and Flomax.  Continue with these medications indefinitely.               Chief Complaint     Chief Complaint   Patient presents with    Follow-up     6 month foloow BHP w/ LUTS. Pt is on Finasteride and flomax       History of Present Illness   Follow-up elevated PSA, status post negative transperineal prostate biopsy by me 2022.  43 cc gland, PSA density 0.15.  He has not had an MRI.  PSA was 6.6 as of 2022.  Previous biopsy by Dr. Vanegas in 2018 was also negative.  Also has history of BPH with lower urinary tract symptoms with urinary frequency nocturia and weak urinary stream and incomplete bladder emptying with a PVR of 65 cc in the office in the past.  Creatinine normal 2023 at 0.86, and PSA is basically stable at 6.74 2023,  6.6 as described above, 5.2 in 2021.  PSA is down to 4.0 as of 2023.  We started him on finasteride his 2023 for BPH with obstruction with lower urinary tract symptoms.  However his PSA has not dropped greater than 50%.  He is pleased with the results on the finasteride and the Flomax.  He is currently basically satisfied with  "his voiding pattern right now.  Still has nocturia 3 times a night.  He thinks he is improved in terms of his urgency and frequency.  Stream is good during the day, little bit slower at night.      The following portions of the patient's history were reviewed and updated as appropriate: allergies, current medications, past family history, past medical history, past social history, past surgical history and problem list.  Past Medical History:   Diagnosis Date    Hyperlipidemia     PONV (postoperative nausea and vomiting)      Past Surgical History:   Procedure Laterality Date    ANKLE SURGERY      MT BX PROSTATE STRTCTC SATURATION SAMPLING IMG GID Bilateral 12/5/2022    Procedure: TRANSPERINEAL ULTRASOUND GUIDED BIOPSY PROSTATE;  Surgeon: Jorge Mckeon MD;  Location: [facility];  Service: Urology    WISDOM TOOTH EXTRACTION      WRIST SURGERY       shoulder  Review of Systems   Review of Systems   Genitourinary:         As per HPI       Active Problem List     Patient Active Problem List   Diagnosis    Acoustic neuroma (HCC)    Anxiety    Elevated prostate specific antigen (PSA)    Generalized osteoarthritis    Hyperlipidemia    Reactive depression    Impotence of organic origin    Cortical age-related cataract of both eyes       Objective   /88   Pulse 81   Ht 5' 9\" (1.753 m)   Wt 90.2 kg (198 lb 12.8 oz)   SpO2 94%   BMI 29.36 kg/m²     Physical Exam  Vitals reviewed.   Constitutional:       Appearance: Normal appearance.   HENT:      Head: Normocephalic and atraumatic.   Eyes:      Extraocular Movements: Extraocular movements intact.   Pulmonary:      Effort: Pulmonary effort is normal.   Musculoskeletal:         General: Normal range of motion.      Cervical back: Normal range of motion.   Skin:     Coloration: Skin is not jaundiced or pale.   Neurological:      General: No focal deficit present.      Mental Status: He is alert and oriented to person, place, and time. Mental status is at baseline. "   Psychiatric:         Mood and Affect: Mood normal.         Behavior: Behavior normal.         Thought Content: Thought content normal.         Judgment: Judgment normal.             Current Medications     Current Outpatient Medications:     ALPRAZolam (XANAX) 0.5 mg tablet, Take 1 tablet (0.5 mg total) by mouth daily at bedtime as needed for anxiety, Disp: 15 tablet, Rfl: 0    Cholecalciferol (VITAMIN D3) 2000 units TABS, Take 1,000 Units by mouth daily, Disp: , Rfl:     finasteride (PROSCAR) 5 mg tablet, Take 1 tablet (5 mg total) by mouth daily, Disp: 90 tablet, Rfl: 3    Multiple Vitamin (MULTI VITAMIN DAILY PO), Take 1 tablet by mouth daily, Disp: , Rfl:     sertraline (ZOLOFT) 25 mg tablet, TAKE 1 TABLET (25 MG TOTAL) BY MOUTH DAILY., Disp: 90 tablet, Rfl: 3    simvastatin (ZOCOR) 20 mg tablet, TAKE 1 TABLET BY MOUTH EVERY DAY, Disp: 90 tablet, Rfl: 3    tamsulosin (FLOMAX) 0.4 mg, TAKE 1 CAPSULE BY MOUTH EVERY DAY WITH DINNER, Disp: 90 capsule, Rfl: 2    ofloxacin (OCUFLOX) 0.3 % ophthalmic solution, , Disp: , Rfl:     prednisoLONE acetate (PRED FORTE) 1 % ophthalmic suspension, PUT 1 DROP INTO RIGHT EYE EVERY 2 HOURS. START AFTER SURGERY COMPLETED. (Patient not taking: Reported on 1/4/2024), Disp: , Rfl:         Jorge Mckeon MD

## 2024-01-24 DIAGNOSIS — E78.5 HYPERLIPIDEMIA, UNSPECIFIED HYPERLIPIDEMIA TYPE: ICD-10-CM

## 2024-01-24 DIAGNOSIS — F41.9 ANXIETY: ICD-10-CM

## 2024-01-24 RX ORDER — SIMVASTATIN 20 MG
TABLET ORAL
Qty: 90 TABLET | Refills: 3 | Status: SHIPPED | OUTPATIENT
Start: 2024-01-24

## 2024-01-24 RX ORDER — SERTRALINE HYDROCHLORIDE 25 MG/1
25 TABLET, FILM COATED ORAL DAILY
Qty: 90 TABLET | Refills: 3 | Status: SHIPPED | OUTPATIENT
Start: 2024-01-24

## 2024-01-25 ENCOUNTER — RA CDI HCC (OUTPATIENT)
Dept: OTHER | Facility: HOSPITAL | Age: 74
End: 2024-01-25

## 2024-01-26 ENCOUNTER — OFFICE VISIT (OUTPATIENT)
Dept: FAMILY MEDICINE CLINIC | Facility: CLINIC | Age: 74
End: 2024-01-26
Payer: MEDICARE

## 2024-01-26 VITALS
BODY MASS INDEX: 29.86 KG/M2 | WEIGHT: 201.6 LBS | HEIGHT: 69 IN | DIASTOLIC BLOOD PRESSURE: 72 MMHG | RESPIRATION RATE: 18 BRPM | SYSTOLIC BLOOD PRESSURE: 130 MMHG | OXYGEN SATURATION: 97 % | TEMPERATURE: 97.7 F | HEART RATE: 107 BPM

## 2024-01-26 DIAGNOSIS — F32.1 MODERATE MAJOR DEPRESSION, SINGLE EPISODE (HCC): ICD-10-CM

## 2024-01-26 DIAGNOSIS — D33.3 ACOUSTIC NEUROMA (HCC): ICD-10-CM

## 2024-01-26 DIAGNOSIS — Z11.59 ENCOUNTER FOR HEPATITIS C SCREENING TEST FOR LOW RISK PATIENT: ICD-10-CM

## 2024-01-26 DIAGNOSIS — E78.5 HYPERLIPIDEMIA, UNSPECIFIED HYPERLIPIDEMIA TYPE: ICD-10-CM

## 2024-01-26 DIAGNOSIS — Z00.00 MEDICARE ANNUAL WELLNESS VISIT, SUBSEQUENT: Primary | ICD-10-CM

## 2024-01-26 DIAGNOSIS — F41.9 ANXIETY: ICD-10-CM

## 2024-01-26 PROBLEM — H25.013 CORTICAL AGE-RELATED CATARACT OF BOTH EYES: Status: RESOLVED | Noted: 2023-07-25 | Resolved: 2024-01-26

## 2024-01-26 PROCEDURE — G0439 PPPS, SUBSEQ VISIT: HCPCS | Performed by: INTERNAL MEDICINE

## 2024-01-26 RX ORDER — ALPRAZOLAM 0.5 MG/1
0.5 TABLET ORAL
Qty: 15 TABLET | Refills: 0 | Status: SHIPPED | OUTPATIENT
Start: 2024-01-26

## 2024-01-26 NOTE — PROGRESS NOTES
Assessment and Plan:     Problem List Items Addressed This Visit          Nervous and Auditory    Acoustic neuroma (HCC)       Other    Anxiety    Relevant Medications    ALPRAZolam (XANAX) 0.5 mg tablet    Hyperlipidemia    Relevant Orders    Comprehensive metabolic panel    Lipid panel    Medicare annual wellness visit, subsequent - Primary     Other Visit Diagnoses       Moderate major depression, single episode (HCC)        Relevant Medications    ALPRAZolam (XANAX) 0.5 mg tablet    Encounter for hepatitis C screening test for low risk patient        Relevant Orders    Hepatitis C antibody        Rx for fbw  He is following with Urology for elevated Psa  Resume walking/portion control and low fat diet   Rto 6 months     Depression Screening and Follow-up Plan: Patient's depression screening was positive with a PHQ-9 score of 11. Patient assessed for underlying major depression. Brief counseling provided and recommend additional follow-up/re-evaluation next office visit.     Preventive health issues were discussed with patient, and age appropriate screening tests were ordered as noted in patient's After Visit Summary.  Personalized health advice and appropriate referrals for health education or preventive services given if needed, as noted in patient's After Visit Summary.     History of Present Illness:     Patient presents for a Medicare Wellness Visit    HPI   Pt generally well No acute issues He has not been as active during the winter and notes some weight gain which he plans to work on   Patient Care Team:  Zully Torres DO as PCP - General  Zully Torres DO     Review of Systems:     Review of Systems   Constitutional:  Negative for chills and fever.   HENT:  Positive for hearing loss.         No acute change wears hearing aides    Respiratory:  Negative for cough and shortness of breath.    Cardiovascular:  Negative for chest pain, palpitations and leg swelling.   Gastrointestinal:  Negative for  abdominal distention and abdominal pain.   Genitourinary: Negative.    Musculoskeletal: Negative.    Neurological:  Negative for dizziness, light-headedness and headaches.   Psychiatric/Behavioral:  Negative for sleep disturbance. The patient is not nervous/anxious.         Problem List:     Patient Active Problem List   Diagnosis    Acoustic neuroma (HCC)    Anxiety    Elevated prostate specific antigen (PSA)    Generalized osteoarthritis    Hyperlipidemia    Medicare annual wellness visit, subsequent    Reactive depression    Impotence of organic origin      Past Medical and Surgical History:     Past Medical History:   Diagnosis Date    Hyperlipidemia     PONV (postoperative nausea and vomiting)      Past Surgical History:   Procedure Laterality Date    ANKLE SURGERY      PA BX PROSTATE STRTCTC SATURATION SAMPLING IMG GID Bilateral 12/5/2022    Procedure: TRANSPERINEAL ULTRASOUND GUIDED BIOPSY PROSTATE;  Surgeon: Jorge Mckeon MD;  Location: [facility];  Service: Urology    WISDOM TOOTH EXTRACTION      WRIST SURGERY        Family History:     Family History   Problem Relation Age of Onset    Alzheimer's disease Mother     Dementia Mother       Social History:     Social History     Socioeconomic History    Marital status: /Civil Union     Spouse name: None    Number of children: None    Years of education: None    Highest education level: None   Occupational History    None   Tobacco Use    Smoking status: Never    Smokeless tobacco: Never    Tobacco comments:     NO TOBACCO USE   Vaping Use    Vaping status: Never Used   Substance and Sexual Activity    Alcohol use: No    Drug use: No    Sexual activity: Yes     Partners: Female   Other Topics Concern    None   Social History Narrative    ALWAYS USES  SEAT BELT    CAFFEINE USE     Social Determinants of Health     Financial Resource Strain: Low Risk  (1/24/2024)    Overall Financial Resource Strain (CARDIA)     Difficulty of Paying Living Expenses: Not  very hard   Food Insecurity: Not on file   Transportation Needs: No Transportation Needs (1/24/2024)    PRAPARE - Transportation     Lack of Transportation (Medical): No     Lack of Transportation (Non-Medical): No   Physical Activity: Not on file   Stress: Not on file   Social Connections: Not on file   Intimate Partner Violence: Not on file   Housing Stability: Not on file      Medications and Allergies:     Current Outpatient Medications   Medication Sig Dispense Refill    ALPRAZolam (XANAX) 0.5 mg tablet Take 1 tablet (0.5 mg total) by mouth daily at bedtime as needed for anxiety 15 tablet 0    Cholecalciferol (VITAMIN D3) 2000 units TABS Take 1,000 Units by mouth daily      finasteride (PROSCAR) 5 mg tablet Take 1 tablet (5 mg total) by mouth daily 90 tablet 3    Multiple Vitamin (MULTI VITAMIN DAILY PO) Take 1 tablet by mouth daily      sertraline (ZOLOFT) 25 mg tablet TAKE 1 TABLET (25 MG TOTAL) BY MOUTH DAILY. 90 tablet 3    simvastatin (ZOCOR) 20 mg tablet TAKE 1 TABLET BY MOUTH EVERY DAY 90 tablet 3    tamsulosin (FLOMAX) 0.4 mg TAKE 1 CAPSULE BY MOUTH EVERY DAY WITH DINNER 90 capsule 2     No current facility-administered medications for this visit.     Allergies   Allergen Reactions    Neomycin-Polymyxin-Hc      rash    Neomycin Rash      Immunizations:     Immunization History   Administered Date(s) Administered    COVID-19 MODERNA VACC 0.5 ML IM 02/16/2021, 03/16/2021, 10/23/2021, 07/17/2022    COVID-19 Moderna Vac BIVALENT 12 Yr+ IM 0.5 ML 10/15/2022    COVID-19 Moderna mRNA Vaccine 12 Yr+ 50 mcg/0.5 mL (Spikevax) 11/06/2023    INFLUENZA 11/09/2007, 12/12/2008, 11/12/2012, 10/14/2013    Influenza Quadrivalent Preservative Free 3 years and older IM 10/13/2015    Influenza Split High Dose Preservative Free IM 10/09/2017    Influenza, Seasonal Vaccine, Quadrivalent, Adjuvanted, .5e 11/06/2023    Influenza, high dose seasonal 0.7 mL 10/03/2018, 10/21/2019, 10/22/2020, 10/10/2022    Influenza, seasonal,  injectable 11/09/2007, 12/12/2008, 11/12/2012, 10/14/2013, 10/08/2014, 10/06/2016    Tdap 06/09/2009    Zoster Vaccine Recombinant 03/20/2023, 06/12/2023      Health Maintenance:         Topic Date Due    Hepatitis C Screening  Never done    Colorectal Cancer Screening  11/30/2025         Topic Date Due    Pneumococcal Vaccine: 65+ Years (1 - PCV) Never done    COVID-19 Vaccine (7 - 2023-24 season) 01/01/2024      Medicare Screening Tests and Risk Assessments:     Morteza is here for his Subsequent Wellness visit. Last Medicare Wellness visit information reviewed, patient interviewed, no change since last AWV.     Health Risk Assessment:   Patient rates overall health as good. Patient feels that their physical health rating is same. Patient is satisfied with their life. Eyesight was rated as same. Hearing was rated as same. Patient feels that their emotional and mental health rating is same. Patients states they are never, rarely angry. Patient states they are sometimes unusually tired/fatigued. Pain experienced in the last 7 days has been some. Patient's pain rating has been 3/10. Patient states that he has experienced weight loss or gain in last 6 months.     Depression Screening:   PHQ-9 Score: 11      Fall Risk Screening:   In the past year, patient has experienced: no history of falling in past year      Home Safety:  Patient does not have trouble with stairs inside or outside of their home. Patient has working smoke alarms and has working carbon monoxide detector. Home safety hazards include: none.     Nutrition:   Current diet is Regular.     Medications:   Patient is currently taking over-the-counter supplements. OTC medications include: see medication list. Patient is able to manage medications.     Activities of Daily Living (ADLs)/Instrumental Activities of Daily Living (IADLs):   Walk and transfer into and out of bed and chair?: Yes  Dress and groom yourself?: Yes    Bathe or shower yourself?: Yes    Feed  yourself? Yes  Do your laundry/housekeeping?: Yes  Manage your money, pay your bills and track your expenses?: Yes  Make your own meals?: Yes    Do your own shopping?: Yes    Previous Hospitalizations:   Any hospitalizations or ED visits within the last 12 months?: No      Advance Care Planning:   Living will: No    Durable POA for healthcare: Yes    Advanced directive: No    Advanced directive counseling given: Yes    End of Life Decisions reviewed with patient: Yes    Provider agrees with end of life decisions: Yes      Cognitive Screening:   Provider or family/friend/caregiver concerned regarding cognition?: No    PREVENTIVE SCREENINGS      Cardiovascular Screening:    General: History Lipid Disorder and Risks and Benefits Discussed    Due for: Lipid Panel      Diabetes Screening:     General: Screening Current      Colorectal Cancer Screening:     General: Screening Current      Prostate Cancer Screening:    General: Screening Current      Osteoporosis Screening:    General: Screening Not Indicated      Abdominal Aortic Aneurysm (AAA) Screening:    Risk factors include: age between 65-74 yo        General: Screening Not Indicated      Lung Cancer Screening:     General: Screening Not Indicated      Hepatitis C Screening:    General: Risks and Benefits Discussed    Hep C Screening Accepted: Yes      Screening, Brief Intervention, and Referral to Treatment (SBIRT)    Screening  Typical number of drinks in a day: 0  Typical number of drinks in a week: 0  Interpretation: Low risk drinking behavior.    AUDIT-C Screenin) How often did you have a drink containing alcohol in the past year? monthly or less  2) How many drinks did you have on a typical day when you were drinking in the past year? 1 to 2  3) How often did you have 6 or more drinks on one occasion in the past year? never    AUDIT-C Score: 1  Interpretation: Score 0-3 (male): Negative screen for alcohol misuse    Single Item Drug Screening:  How often  "have you used an illegal drug (including marijuana) or a prescription medication for non-medical reasons in the past year? never    Single Item Drug Screen Score: 0  Interpretation: Negative screen for possible drug use disorder    Brief Intervention  Alcohol & drug use screenings were reviewed. No concerns regarding substance use disorder identified.     Other Counseling Topics:   Regular weightbearing exercise.     No results found.     Physical Exam:     /72   Pulse (!) 107   Temp 97.7 °F (36.5 °C) (Temporal)   Resp 18   Ht 5' 9\" (1.753 m)   Wt 91.4 kg (201 lb 9.6 oz)   SpO2 97%   BMI 29.77 kg/m²     Physical Exam  Vitals and nursing note reviewed.   Constitutional:       General: He is not in acute distress.     Appearance: Normal appearance. He is not ill-appearing, toxic-appearing or diaphoretic.   HENT:      Head: Normocephalic and atraumatic.      Right Ear: External ear normal.      Left Ear: External ear normal.      Nose: Nose normal.      Mouth/Throat:      Mouth: Mucous membranes are moist.   Eyes:      General: No scleral icterus.  Cardiovascular:      Rate and Rhythm: Normal rate and regular rhythm.      Pulses: Normal pulses.      Heart sounds: Normal heart sounds.   Pulmonary:      Effort: Pulmonary effort is normal. No respiratory distress.      Breath sounds: Normal breath sounds. No wheezing.   Abdominal:      General: Bowel sounds are normal. There is no distension.      Palpations: Abdomen is soft.      Tenderness: There is no abdominal tenderness.   Musculoskeletal:      Cervical back: Normal range of motion and neck supple.      Right lower leg: No edema.      Left lower leg: No edema.   Lymphadenopathy:      Cervical: No cervical adenopathy.   Skin:     General: Skin is warm and dry.      Coloration: Skin is not jaundiced or pale.   Neurological:      General: No focal deficit present.      Mental Status: He is alert and oriented to person, place, and time. Mental status is at " baseline.      Cranial Nerves: No cranial nerve deficit.   Psychiatric:         Mood and Affect: Mood normal.         Behavior: Behavior normal.         Thought Content: Thought content normal.         Judgment: Judgment normal.          Zully Torres,   Depression Screening Follow-up Plan: Patient's depression screening was positive with a PHQ-2 score of . Their PHQ-9 score was 11. Patient assessed for underlying major depression. They have no active suicidal ideations. Brief counseling provided and recommend additional follow-up/re-evaluation next office visit.

## 2024-01-26 NOTE — PATIENT INSTRUCTIONS
Medicare Preventive Visit Patient Instructions  Thank you for completing your Welcome to Medicare Visit or Medicare Annual Wellness Visit today. Your next wellness visit will be due in one year (1/26/2025).  The screening/preventive services that you may require over the next 5-10 years are detailed below. Some tests may not apply to you based off risk factors and/or age. Screening tests ordered at today's visit but not completed yet may show as past due. Also, please note that scanned in results may not display below.  Preventive Screenings:  Service Recommendations Previous Testing/Comments   Colorectal Cancer Screening  Colonoscopy    Fecal Occult Blood Test (FOBT)/Fecal Immunochemical Test (FIT)  Fecal DNA/Cologuard Test  Flexible Sigmoidoscopy Age: 45-75 years old   Colonoscopy: every 10 years (May be performed more frequently if at higher risk)  OR  FOBT/FIT: every 1 year  OR  Cologuard: every 3 years  OR  Sigmoidoscopy: every 5 years  Screening may be recommended earlier than age 45 if at higher risk for colorectal cancer. Also, an individualized decision between you and your healthcare provider will decide whether screening between the ages of 76-85 would be appropriate. Colonoscopy: 11/30/2015  FOBT/FIT: Not on file  Cologuard: Not on file  Sigmoidoscopy: Not on file          Prostate Cancer Screening Individualized decision between patient and health care provider in men between ages of 55-69   Medicare will cover every 12 months beginning on the day after your 50th birthday PSA: 4.0 ng/mL           Hepatitis C Screening Once for adults born between 1945 and 1965  More frequently in patients at high risk for Hepatitis C Hep C Antibody: Not on file        Diabetes Screening 1-2 times per year if you're at risk for diabetes or have pre-diabetes Fasting glucose: 92 mg/dL (6/12/2023)  A1C: No results in last 5 years (No results in last 5 years)      Cholesterol Screening Once every 5 years if you don't have a  lipid disorder. May order more often based on risk factors. Lipid panel: 06/12/2023         Other Preventive Screenings Covered by Medicare:  Abdominal Aortic Aneurysm (AAA) Screening: covered once if your at risk. You're considered to be at risk if you have a family history of AAA or a male between the age of 65-75 who smoking at least 100 cigarettes in your lifetime.  Lung Cancer Screening: covers low dose CT scan once per year if you meet all of the following conditions: (1) Age 55-77; (2) No signs or symptoms of lung cancer; (3) Current smoker or have quit smoking within the last 15 years; (4) You have a tobacco smoking history of at least 20 pack years (packs per day x number of years you smoked); (5) You get a written order from a healthcare provider.  Glaucoma Screening: covered annually if you're considered high risk: (1) You have diabetes OR (2) Family history of glaucoma OR (3)  aged 50 and older OR (4)  American aged 65 and older  Osteoporosis Screening: covered every 2 years if you meet one of the following conditions: (1) Have a vertebral abnormality; (2) On glucocorticoid therapy for more than 3 months; (3) Have primary hyperparathyroidism; (4) On osteoporosis medications and need to assess response to drug therapy.  HIV Screening: covered annually if you're between the age of 15-65. Also covered annually if you are younger than 15 and older than 65 with risk factors for HIV infection. For pregnant patients, it is covered up to 3 times per pregnancy.    Immunizations:  Immunization Recommendations   Influenza Vaccine Annual influenza vaccination during flu season is recommended for all persons aged >= 6 months who do not have contraindications   Pneumococcal Vaccine   * Pneumococcal conjugate vaccine = PCV13 (Prevnar 13), PCV15 (Vaxneuvance), PCV20 (Prevnar 20)  * Pneumococcal polysaccharide vaccine = PPSV23 (Pneumovax) Adults 19-63 yo with certain risk factors or if 65+ yo  If  never received any pneumonia vaccine: recommend Prevnar 20 (PCV20)  Give PCV20 if previously received 1 dose of PCV13 or PPSV23   Hepatitis B Vaccine 3 dose series if at intermediate or high risk (ex: diabetes, end stage renal disease, liver disease)   Respiratory syncytial virus (RSV) Vaccine - COVERED BY MEDICARE PART D  * RSVPreF3 (Arexvy) CDC recommends that adults 60 years of age and older may receive a single dose of RSV vaccine using shared clinical decision-making (SCDM)   Tetanus (Td) Vaccine - COST NOT COVERED BY MEDICARE PART B Following completion of primary series, a booster dose should be given every 10 years to maintain immunity against tetanus. Td may also be given as tetanus wound prophylaxis.   Tdap Vaccine - COST NOT COVERED BY MEDICARE PART B Recommended at least once for all adults. For pregnant patients, recommended with each pregnancy.   Shingles Vaccine (Shingrix) - COST NOT COVERED BY MEDICARE PART B  2 shot series recommended in those 19 years and older who have or will have weakened immune systems or those 50 years and older     Health Maintenance Due:      Topic Date Due   • Hepatitis C Screening  Never done   • Colorectal Cancer Screening  11/30/2025     Immunizations Due:      Topic Date Due   • Pneumococcal Vaccine: 65+ Years (1 - PCV) Never done   • COVID-19 Vaccine (7 - 2023-24 season) 01/01/2024     Advance Directives   What are advance directives?  Advance directives are legal documents that state your wishes and plans for medical care. These plans are made ahead of time in case you lose your ability to make decisions for yourself. Advance directives can apply to any medical decision, such as the treatments you want, and if you want to donate organs.   What are the types of advance directives?  There are many types of advance directives, and each state has rules about how to use them. You may choose a combination of any of the following:  Living will:  This is a written record of  the treatment you want. You can also choose which treatments you do not want, which to limit, and which to stop at a certain time. This includes surgery, medicine, IV fluid, and tube feedings.   Durable power of  for healthcare (DPAHC):  This is a written record that states who you want to make healthcare choices for you when you are unable to make them for yourself. This person, called a proxy, is usually a family member or a friend. You may choose more than 1 proxy.  Do not resuscitate (DNR) order:  A DNR order is used in case your heart stops beating or you stop breathing. It is a request not to have certain forms of treatment, such as CPR. A DNR order may be included in other types of advance directives.  Medical directive:  This covers the care that you want if you are in a coma, near death, or unable to make decisions for yourself. You can list the treatments you want for each condition. Treatment may include pain medicine, surgery, blood transfusions, dialysis, IV or tube feedings, and a ventilator (breathing machine).  Values history:  This document has questions about your views, beliefs, and how you feel and think about life. This information can help others choose the care that you would choose.  Why are advance directives important?  An advance directive helps you control your care. Although spoken wishes may be used, it is better to have your wishes written down. Spoken wishes can be misunderstood, or not followed. Treatments may be given even if you do not want them. An advance directive may make it easier for your family to make difficult choices about your care.   Weight Management   Why it is important to manage your weight:  Being overweight increases your risk of health conditions such as heart disease, high blood pressure, type 2 diabetes, and certain types of cancer. It can also increase your risk for osteoarthritis, sleep apnea, and other respiratory problems. Aim for a slow, steady  weight loss. Even a small amount of weight loss can lower your risk of health problems.  How to lose weight safely:  A safe and healthy way to lose weight is to eat fewer calories and get regular exercise. You can lose up about 1 pound a week by decreasing the number of calories you eat by 500 calories each day.   Healthy meal plan for weight management:  A healthy meal plan includes a variety of foods, contains fewer calories, and helps you stay healthy. A healthy meal plan includes the following:  Eat whole-grain foods more often.  A healthy meal plan should contain fiber. Fiber is the part of grains, fruits, and vegetables that is not broken down by your body. Whole-grain foods are healthy and provide extra fiber in your diet. Some examples of whole-grain foods are whole-wheat breads and pastas, oatmeal, brown rice, and bulgur.  Eat a variety of vegetables every day.  Include dark, leafy greens such as spinach, kale, shabnam greens, and mustard greens. Eat yellow and orange vegetables such as carrots, sweet potatoes, and winter squash.   Eat a variety of fruits every day.  Choose fresh or canned fruit (canned in its own juice or light syrup) instead of juice. Fruit juice has very little or no fiber.  Eat low-fat dairy foods.  Drink fat-free (skim) milk or 1% milk. Eat fat-free yogurt and low-fat cottage cheese. Try low-fat cheeses such as mozzarella and other reduced-fat cheeses.  Choose meat and other protein foods that are low in fat.  Choose beans or other legumes such as split peas or lentils. Choose fish, skinless poultry (chicken or turkey), or lean cuts of red meat (beef or pork). Before you cook meat or poultry, cut off any visible fat.   Use less fat and oil.  Try baking foods instead of frying them. Add less fat, such as margarine, sour cream, regular salad dressing and mayonnaise to foods. Eat fewer high-fat foods. Some examples of high-fat foods include french fries, doughnuts, ice cream, and  cakes.  Eat fewer sweets.  Limit foods and drinks that are high in sugar. This includes candy, cookies, regular soda, and sweetened drinks.  Exercise:  Exercise at least 30 minutes per day on most days of the week. Some examples of exercise include walking, biking, dancing, and swimming. You can also fit in more physical activity by taking the stairs instead of the elevator or parking farther away from stores. Ask your healthcare provider about the best exercise plan for you.      © Copyright [employer] 2018 Information is for End User's use only and may not be sold, redistributed or otherwise used for commercial purposes. All illustrations and images included in CareNotes® are the copyrighted property of [employer].A[employer]M., Inc. or [employer]

## 2024-02-21 PROBLEM — Z00.00 MEDICARE ANNUAL WELLNESS VISIT, SUBSEQUENT: Status: RESOLVED | Noted: 2018-12-18 | Resolved: 2024-02-21

## 2024-03-13 DIAGNOSIS — R35.1 BENIGN PROSTATIC HYPERPLASIA WITH NOCTURIA: ICD-10-CM

## 2024-03-13 DIAGNOSIS — N40.1 BENIGN PROSTATIC HYPERPLASIA WITH NOCTURIA: ICD-10-CM

## 2024-03-13 RX ORDER — TAMSULOSIN HYDROCHLORIDE 0.4 MG/1
CAPSULE ORAL
Qty: 90 CAPSULE | Refills: 1 | Status: SHIPPED | OUTPATIENT
Start: 2024-03-13

## 2024-04-25 ENCOUNTER — OFFICE VISIT (OUTPATIENT)
Dept: FAMILY MEDICINE CLINIC | Facility: CLINIC | Age: 74
End: 2024-04-25
Payer: MEDICARE

## 2024-04-25 ENCOUNTER — OFFICE VISIT (OUTPATIENT)
Dept: LAB | Facility: HOSPITAL | Age: 74
End: 2024-04-25
Payer: MEDICARE

## 2024-04-25 ENCOUNTER — NURSE TRIAGE (OUTPATIENT)
Age: 74
End: 2024-04-25

## 2024-04-25 ENCOUNTER — TELEPHONE (OUTPATIENT)
Age: 74
End: 2024-04-25

## 2024-04-25 VITALS
BODY MASS INDEX: 29.53 KG/M2 | DIASTOLIC BLOOD PRESSURE: 70 MMHG | HEART RATE: 95 BPM | SYSTOLIC BLOOD PRESSURE: 124 MMHG | OXYGEN SATURATION: 96 % | TEMPERATURE: 97.7 F | WEIGHT: 199.4 LBS | RESPIRATION RATE: 18 BRPM | HEIGHT: 69 IN

## 2024-04-25 DIAGNOSIS — R53.83 FATIGUE, UNSPECIFIED TYPE: ICD-10-CM

## 2024-04-25 DIAGNOSIS — Z13.6 SCREENING FOR CARDIOVASCULAR CONDITION: ICD-10-CM

## 2024-04-25 DIAGNOSIS — J06.9 UPPER RESPIRATORY TRACT INFECTION, UNSPECIFIED TYPE: Primary | ICD-10-CM

## 2024-04-25 PROCEDURE — G2211 COMPLEX E/M VISIT ADD ON: HCPCS | Performed by: INTERNAL MEDICINE

## 2024-04-25 PROCEDURE — 99214 OFFICE O/P EST MOD 30 MIN: CPT | Performed by: INTERNAL MEDICINE

## 2024-04-25 PROCEDURE — 93005 ELECTROCARDIOGRAM TRACING: CPT

## 2024-04-25 PROCEDURE — 87635 SARS-COV-2 COVID-19 AMP PRB: CPT | Performed by: INTERNAL MEDICINE

## 2024-04-25 RX ORDER — DEXTROMETHORPHAN HYDROBROMIDE AND PROMETHAZINE HYDROCHLORIDE 15; 6.25 MG/5ML; MG/5ML
5 SYRUP ORAL 4 TIMES DAILY PRN
Qty: 240 ML | Refills: 1 | Status: SHIPPED | OUTPATIENT
Start: 2024-04-25

## 2024-04-25 RX ORDER — AZELASTINE 1 MG/ML
1 SPRAY, METERED NASAL 2 TIMES DAILY
Qty: 30 ML | Refills: 1 | Status: SHIPPED | OUTPATIENT
Start: 2024-04-25

## 2024-04-25 NOTE — TELEPHONE ENCOUNTER
A user error has taken place: encounter opened in error, closed for administrative reasons.     Call transferred to nurse triage.

## 2024-04-25 NOTE — TELEPHONE ENCOUNTER
"Pt reports cough, body aches, chills since yesterday. No fevers, highest temp 99. Denies SOB, difficulty breathing, wheezing. Reports some chest discomfort with coughing. Cough productive of clear secretions. Pt and wife concerned, would like to be seen. OV scheduled for this afternoon. Advised to call back or seek higher level of care with any changes or worsening symptoms. Verbalized understanding.     Reason for Disposition   Patient wants to be seen    Answer Assessment - Initial Assessment Questions  1. ONSET: \"When did the cough begin?\"       yesterday  2. SEVERITY: \"How bad is the cough today?\"       moderate  3. SPUTUM: \"Describe the color of your sputum\" (none, dry cough; clear, white, yellow, green)      clear  4. HEMOPTYSIS: \"Are you coughing up any blood?\" If so ask: \"How much?\" (flecks, streaks, tablespoons, etc.)      denies  5. DIFFICULTY BREATHING: \"Are you having difficulty breathing?\" If Yes, ask: \"How bad is it?\" (e.g., mild, moderate, severe)     - MILD: No SOB at rest, mild SOB with walking, speaks normally in sentences, can lay down, no retractions, pulse < 100.     - MODERATE: SOB at rest, SOB with minimal exertion and prefers to sit, cannot lie down flat, speaks in phrases, mild retractions, audible wheezing, pulse 100-120.     - SEVERE: Very SOB at rest, speaks in single words, struggling to breathe, sitting hunched forward, retractions, pulse > 120       denies  6. FEVER: \"Do you have a fever?\" If Yes, ask: \"What is your temperature, how was it measured, and when did it start?\"      denies  7. CARDIAC HISTORY: \"Do you have any history of heart disease?\" (e.g., heart attack, congestive heart failure)       denies  8. LUNG HISTORY: \"Do you have any history of lung disease?\"  (e.g., pulmonary embolus, asthma, emphysema)      denies  9. PE RISK FACTORS: \"Do you have a history of blood clots?\" (or: recent major surgery, recent prolonged travel, bedridden)      denies  10. OTHER SYMPTOMS: \"Do you " "have any other symptoms?\" (e.g., runny nose, wheezing, chest pain)        Some chest discomfort 6/10   11. PREGNANCY: \"Is there any chance you are pregnant?\" \"When was your last menstrual period?\"        denies  12. TRAVEL: \"Have you traveled out of the country in the last month?\" (e.g., travel history, exposures)        denies    Protocols used: Cough-ADULT-[place]    "

## 2024-04-25 NOTE — PROGRESS NOTES
Name: Morteza Garcia      : 1950      MRN: [id number]  Encounter Provider: Zully Torres DO  Encounter Date: 2024   Encounter department: [facility]    Assessment & Plan     1. Upper respiratory tract infection, unspecified type  -     promethazine-dextromethorphan (PHENERGAN-DM) 6.25-15 mg/5 mL oral syrup; Take 5 mL by mouth 4 (four) times a day as needed for cough  -     azelastine (ASTELIN) 0.1 % nasal spray; 1 spray into each nostril 2 (two) times a day Use in each nostril as directed  -     COVID Only- Office Collect   Rest, fluids and use medications as above      2. Fatigue, unspecified type  -     ECG 12 lead; Future  Check EKG and if abnormal will need further workup - concerned due to fatigue sxs He walks regularly without chest pain or sob     3. Screening for cardiovascular condition  -     ECG 12 lead; Future       As scheduled    Subjective      HPI  Sick visit Cough since Tuesday PM Today it is less Had temp of 99 Using otc cough medicine No n/v/d Appetite normal No sob Tried to do covid home test but does not think done right as no result per pt No headache No ear pain Using otc patel spray but not sure what type No chest pain or sob Feels achy He is drinking water Pt feels tired all the time despite adequate sleep and thinks he needs cardiac workup He walks daily without chest pain or sob No dizziness or syncope   Review of Systems   Constitutional:  Negative for chills and fever.   HENT:  Positive for congestion.    Eyes:  Negative for visual disturbance.   Respiratory:  Positive for cough. Negative for shortness of breath.    Cardiovascular:  Negative for chest pain, palpitations and leg swelling.   Musculoskeletal:  Positive for myalgias.   Neurological:  Negative for dizziness, light-headedness and headaches.   Psychiatric/Behavioral:  Negative for sleep disturbance. The patient is not nervous/anxious.        Current Outpatient Medications on File Prior to Visit  "  Medication Sig    ALPRAZolam (XANAX) 0.5 mg tablet Take 1 tablet (0.5 mg total) by mouth daily at bedtime as needed for anxiety    Cholecalciferol (VITAMIN D3) 2000 units TABS Take 1,000 Units by mouth daily    finasteride (PROSCAR) 5 mg tablet Take 1 tablet (5 mg total) by mouth daily    Multiple Vitamin (MULTI VITAMIN DAILY PO) Take 1 tablet by mouth daily    sertraline (ZOLOFT) 25 mg tablet TAKE 1 TABLET (25 MG TOTAL) BY MOUTH DAILY.    simvastatin (ZOCOR) 20 mg tablet TAKE 1 TABLET BY MOUTH EVERY DAY    tamsulosin (FLOMAX) 0.4 mg TAKE 1 CAPSULE BY MOUTH EVERY DAY WITH DINNER       Objective     /70   Pulse 95   Temp 97.7 °F (36.5 °C) (Temporal)   Resp 18   Ht 5' 9\" (1.753 m)   Wt 90.4 kg (199 lb 6.4 oz)   SpO2 96%   BMI 29.45 kg/m²     Physical Exam  Vitals and nursing note reviewed.   Constitutional:       General: He is not in acute distress.     Appearance: Normal appearance. He is not ill-appearing, toxic-appearing or diaphoretic.   HENT:      Head: Normocephalic and atraumatic.      Right Ear: External ear normal.      Left Ear: External ear normal.      Nose: Nose normal.      Mouth/Throat:      Mouth: Mucous membranes are moist.   Eyes:      General: No scleral icterus.     Extraocular Movements: Extraocular movements intact.      Conjunctiva/sclera: Conjunctivae normal.      Pupils: Pupils are equal, round, and reactive to light.   Cardiovascular:      Rate and Rhythm: Normal rate and regular rhythm.      Pulses: Normal pulses.      Heart sounds: Normal heart sounds.   Pulmonary:      Effort: Pulmonary effort is normal. No respiratory distress.      Breath sounds: Normal breath sounds. No wheezing.   Abdominal:      General: Bowel sounds are normal. There is no distension.      Palpations: Abdomen is soft.      Tenderness: There is no abdominal tenderness.   Musculoskeletal:      Cervical back: Normal range of motion and neck supple. No rigidity.      Right lower leg: No edema.      Left " lower leg: No edema.   Lymphadenopathy:      Cervical: No cervical adenopathy.   Skin:     General: Skin is warm and dry.      Findings: No erythema or rash.   Neurological:      General: No focal deficit present.      Mental Status: He is alert and oriented to person, place, and time. Mental status is at baseline.      Cranial Nerves: No cranial nerve deficit.   Psychiatric:         Mood and Affect: Mood normal.         Behavior: Behavior normal.         Thought Content: Thought content normal.         Judgment: Judgment normal.       Zully Torres, DO

## 2024-04-26 DIAGNOSIS — U07.1 COVID-19: Primary | ICD-10-CM

## 2024-04-26 LAB
ATRIAL RATE: 86 BPM
P AXIS: 51 DEGREES
PR INTERVAL: 184 MS
QRS AXIS: -21 DEGREES
QRSD INTERVAL: 86 MS
QT INTERVAL: 342 MS
QTC INTERVAL: 409 MS
SARS-COV-2 RNA RESP QL NAA+PROBE: POSITIVE
T WAVE AXIS: 17 DEGREES
VENTRICULAR RATE: 86 BPM

## 2024-04-26 PROCEDURE — 93010 ELECTROCARDIOGRAM REPORT: CPT | Performed by: INTERNAL MEDICINE

## 2024-04-26 RX ORDER — NIRMATRELVIR AND RITONAVIR 300-100 MG
3 KIT ORAL 2 TIMES DAILY
Qty: 30 TABLET | Refills: 0 | Status: SHIPPED | OUTPATIENT
Start: 2024-04-26 | End: 2024-05-01

## 2024-06-24 DIAGNOSIS — R35.1 BENIGN PROSTATIC HYPERPLASIA WITH NOCTURIA: ICD-10-CM

## 2024-06-24 DIAGNOSIS — R97.20 ELEVATED PSA: ICD-10-CM

## 2024-06-24 DIAGNOSIS — N40.1 BENIGN PROSTATIC HYPERPLASIA WITH NOCTURIA: ICD-10-CM

## 2024-06-24 RX ORDER — FINASTERIDE 5 MG/1
5 TABLET, FILM COATED ORAL DAILY
Qty: 90 TABLET | Refills: 1 | Status: SHIPPED | OUTPATIENT
Start: 2024-06-24

## 2024-07-08 ENCOUNTER — APPOINTMENT (OUTPATIENT)
Dept: LAB | Facility: CLINIC | Age: 74
End: 2024-07-08
Payer: MEDICARE

## 2024-07-08 DIAGNOSIS — R97.20 ELEVATED PSA: ICD-10-CM

## 2024-07-08 DIAGNOSIS — Z11.59 ENCOUNTER FOR HEPATITIS C SCREENING TEST FOR LOW RISK PATIENT: ICD-10-CM

## 2024-07-08 DIAGNOSIS — E78.5 HYPERLIPIDEMIA, UNSPECIFIED HYPERLIPIDEMIA TYPE: ICD-10-CM

## 2024-07-08 LAB
ALBUMIN SERPL BCG-MCNC: 3.9 G/DL (ref 3.5–5)
ALP SERPL-CCNC: 59 U/L (ref 34–104)
ALT SERPL W P-5'-P-CCNC: 18 U/L (ref 7–52)
ANION GAP SERPL CALCULATED.3IONS-SCNC: 10 MMOL/L (ref 4–13)
AST SERPL W P-5'-P-CCNC: 14 U/L (ref 13–39)
BILIRUB SERPL-MCNC: 0.52 MG/DL (ref 0.2–1)
BUN SERPL-MCNC: 18 MG/DL (ref 5–25)
CALCIUM SERPL-MCNC: 9.1 MG/DL (ref 8.4–10.2)
CHLORIDE SERPL-SCNC: 110 MMOL/L (ref 96–108)
CHOLEST SERPL-MCNC: 184 MG/DL
CO2 SERPL-SCNC: 22 MMOL/L (ref 21–32)
CREAT SERPL-MCNC: 0.74 MG/DL (ref 0.6–1.3)
GFR SERPL CREATININE-BSD FRML MDRD: 92 ML/MIN/1.73SQ M
GLUCOSE P FAST SERPL-MCNC: 86 MG/DL (ref 65–99)
HCV AB SER QL: NORMAL
HDLC SERPL-MCNC: 61 MG/DL
LDLC SERPL CALC-MCNC: 101 MG/DL (ref 0–100)
NONHDLC SERPL-MCNC: 123 MG/DL
POTASSIUM SERPL-SCNC: 4.5 MMOL/L (ref 3.5–5.3)
PROT SERPL-MCNC: 6.8 G/DL (ref 6.4–8.4)
PSA FREE MFR SERPL: 14.69 %
PSA FREE SERPL-MCNC: 0.5 NG/ML
PSA SERPL-MCNC: 3.4 NG/ML (ref 0–4)
SODIUM SERPL-SCNC: 142 MMOL/L (ref 135–147)
TRIGL SERPL-MCNC: 110 MG/DL

## 2024-07-08 PROCEDURE — 80053 COMPREHEN METABOLIC PANEL: CPT

## 2024-07-08 PROCEDURE — 84153 ASSAY OF PSA TOTAL: CPT

## 2024-07-08 PROCEDURE — 36415 COLL VENOUS BLD VENIPUNCTURE: CPT

## 2024-07-08 PROCEDURE — 80061 LIPID PANEL: CPT

## 2024-07-08 PROCEDURE — 84154 ASSAY OF PSA FREE: CPT

## 2024-07-08 PROCEDURE — 86803 HEPATITIS C AB TEST: CPT

## 2024-07-16 ENCOUNTER — OFFICE VISIT (OUTPATIENT)
Dept: UROLOGY | Facility: CLINIC | Age: 74
End: 2024-07-16
Payer: MEDICARE

## 2024-07-16 VITALS
OXYGEN SATURATION: 94 % | BODY MASS INDEX: 29.62 KG/M2 | HEIGHT: 69 IN | DIASTOLIC BLOOD PRESSURE: 84 MMHG | WEIGHT: 200 LBS | HEART RATE: 105 BPM | SYSTOLIC BLOOD PRESSURE: 144 MMHG

## 2024-07-16 DIAGNOSIS — R97.20 ELEVATED PSA: Primary | ICD-10-CM

## 2024-07-16 DIAGNOSIS — R35.1 BENIGN PROSTATIC HYPERPLASIA WITH NOCTURIA: ICD-10-CM

## 2024-07-16 DIAGNOSIS — N40.1 BENIGN PROSTATIC HYPERPLASIA WITH NOCTURIA: ICD-10-CM

## 2024-07-16 PROCEDURE — 99214 OFFICE O/P EST MOD 30 MIN: CPT | Performed by: UROLOGY

## 2024-07-16 NOTE — PROGRESS NOTES
UROLOGY PROGRESS NOTE   [facility]  [address]  [address]  [place] [address]  [phone]  Fax:[phone]  [email or web address]      NAME: Morteza Garcia  AGE: 73 y.o. SEX: male  : 1950   MRN: [id number]    DATE: 2024  TIME: 9:36 AM    Assessment and Plan:    Elevated PSA status post 2 negative biopsies, no previous MRI but his PSA is roughly about 50% dropped on the finasteride.  Given that he has had 2 negative biopsies and the PSA is not markedly elevated, roughly a 50% drop on finasteride, we will recheck another PSA in 6 months.  Will do this with free and total fractions.  He is agreeable to this plan and does not wish to pursue MRI at this time.    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  Continue this indefinitely.    Erectile dysfunction: He will let me know if he wants more sildenafil and I will call this in upon his request.               Chief Complaint     Chief Complaint   Patient presents with    Elevated PSA     6 month follow-up.       History of Present Illness   6-month follow-up for elevated PSA-last seen by me 2024 and he is status post negative transperineal prostate biopsy by me 2022.  He had a 43 cc gland, PSA density 0.15.  No MRI.  Previous biopsy by Dr. Vanegas in 2018 was also negative.  Also has BPH with lower urinary tract symptoms with urinary frequency, nocturia and weak urinary stream and incomplete bladder emptying with a PVR of 65 cc in the office in the past.  PSA had dropped down to 4.0 as 2023.  He was started on finasteride 2023.  PSA had not dropped greater than 50%.  The agreement was to do an MR multiparametric MRI of the prostate if his PSA was still not decreased by 50%.  He had COVID a couple months ago and he had to stop the finasteride for 10 days.  He has resumed it since.  Component  Ref Range & Units 24  7:10 AM 23  7:12 AM 23   "7:11 AM 8/22/22  7:13 AM 9/9/21  7:21 AM 4/27/18  7:54 AM   PSA, Diagnostic  0.000 - 4.000 ng/mL 3.398 4.0 R, CM 6.74 High  R, CM 6.6 High  R, CM 5.2 High  R, CM 5.5 High  R, CM       With regards to his BPH he has had satisfactory improvement with finasteride and Flomax.  \"I am okay\" definite improvement in nocturia, definite improvement since starting the medication.    ED: He has Viagra from 2 years ago.  He is on the fence whether he wants to refill this prescription or not.  The following portions of the patient's history were reviewed and updated as appropriate: allergies, current medications, past family history, past medical history, past social history, past surgical history and problem list.  Past Medical History:   Diagnosis Date    Hyperlipidemia     PONV (postoperative nausea and vomiting)      Past Surgical History:   Procedure Laterality Date    ANKLE SURGERY      MS BX PROSTATE STRTCTC SATURATION SAMPLING IMG GID Bilateral 12/5/2022    Procedure: TRANSPERINEAL ULTRASOUND GUIDED BIOPSY PROSTATE;  Surgeon: Jorge Mckeon MD;  Location: [facility];  Service: Urology    WISDOM TOOTH EXTRACTION      WRIST SURGERY       shoulder  Review of Systems   Review of Systems   Constitutional:  Negative for fever.   Respiratory:  Negative for shortness of breath.    Cardiovascular:  Negative for chest pain.   Genitourinary:  Negative for hematuria.        As per HPI       Active Problem List     Patient Active Problem List   Diagnosis    Acoustic neuroma (HCC)    Anxiety    Elevated prostate specific antigen (PSA)    Generalized osteoarthritis    Hyperlipidemia    Reactive depression    Impotence of organic origin    Upper respiratory tract infection       Objective   /84   Pulse 105   Ht 5' 9\" (1.753 m)   Wt 90.7 kg (200 lb)   SpO2 94%   BMI 29.53 kg/m²     Physical Exam  Vitals reviewed.   Constitutional:       Appearance: Normal appearance.   HENT:      Head: Normocephalic and atraumatic.   Eyes:      " Extraocular Movements: Extraocular movements intact.   Pulmonary:      Effort: Pulmonary effort is normal.   Musculoskeletal:         General: Normal range of motion.      Cervical back: Normal range of motion.   Skin:     Coloration: Skin is not jaundiced or pale.   Neurological:      General: No focal deficit present.      Mental Status: He is alert and oriented to person, place, and time. Mental status is at baseline.   Psychiatric:         Mood and Affect: Mood normal.         Behavior: Behavior normal.         Thought Content: Thought content normal.         Judgment: Judgment normal.             Current Medications     Current Outpatient Medications:     ALPRAZolam (XANAX) 0.5 mg tablet, Take 1 tablet (0.5 mg total) by mouth daily at bedtime as needed for anxiety, Disp: 15 tablet, Rfl: 0    Cholecalciferol (VITAMIN D3) 2000 units TABS, Take 1,000 Units by mouth daily, Disp: , Rfl:     finasteride (PROSCAR) 5 mg tablet, TAKE 1 TABLET (5 MG TOTAL) BY MOUTH DAILY., Disp: 90 tablet, Rfl: 1    Multiple Vitamin (MULTI VITAMIN DAILY PO), Take 1 tablet by mouth daily, Disp: , Rfl:     sertraline (ZOLOFT) 25 mg tablet, TAKE 1 TABLET (25 MG TOTAL) BY MOUTH DAILY., Disp: 90 tablet, Rfl: 3    simvastatin (ZOCOR) 20 mg tablet, TAKE 1 TABLET BY MOUTH EVERY DAY, Disp: 90 tablet, Rfl: 3    tamsulosin (FLOMAX) 0.4 mg, TAKE 1 CAPSULE BY MOUTH EVERY DAY WITH DINNER, Disp: 90 capsule, Rfl: 1        Jorge Mckeon MD

## 2024-08-05 ENCOUNTER — OFFICE VISIT (OUTPATIENT)
Dept: FAMILY MEDICINE CLINIC | Facility: CLINIC | Age: 74
End: 2024-08-05
Payer: MEDICARE

## 2024-08-05 VITALS
SYSTOLIC BLOOD PRESSURE: 138 MMHG | TEMPERATURE: 97.4 F | OXYGEN SATURATION: 95 % | DIASTOLIC BLOOD PRESSURE: 80 MMHG | BODY MASS INDEX: 29.77 KG/M2 | HEART RATE: 97 BPM | WEIGHT: 201 LBS | HEIGHT: 69 IN | RESPIRATION RATE: 18 BRPM

## 2024-08-05 DIAGNOSIS — E78.5 HYPERLIPIDEMIA, UNSPECIFIED HYPERLIPIDEMIA TYPE: ICD-10-CM

## 2024-08-05 DIAGNOSIS — D33.3 ACOUSTIC NEUROMA (HCC): Primary | ICD-10-CM

## 2024-08-05 DIAGNOSIS — M15.9 GENERALIZED OSTEOARTHRITIS: ICD-10-CM

## 2024-08-05 DIAGNOSIS — F41.9 ANXIETY: ICD-10-CM

## 2024-08-05 PROBLEM — J06.9 UPPER RESPIRATORY TRACT INFECTION: Status: RESOLVED | Noted: 2024-04-25 | Resolved: 2024-08-05

## 2024-08-05 PROCEDURE — 99214 OFFICE O/P EST MOD 30 MIN: CPT | Performed by: INTERNAL MEDICINE

## 2024-08-05 PROCEDURE — G2211 COMPLEX E/M VISIT ADD ON: HCPCS | Performed by: INTERNAL MEDICINE

## 2024-08-05 RX ORDER — ALPRAZOLAM 0.5 MG/1
0.5 TABLET ORAL
Qty: 15 TABLET | Refills: 0 | Status: SHIPPED | OUTPATIENT
Start: 2024-08-05

## 2024-08-05 NOTE — PROGRESS NOTES
Ambulatory Visit  Name: Morteza Garcia      : 1950      MRN: [id number]  Encounter Provider: Zully Torres DO  Encounter Date: 2024   Encounter department: [facility]    Assessment & Plan   1. Acoustic neuroma (HCC)  Pt follows locally with ENT No recent issues or sxs noted Has appt scheduled later this year   2. Anxiety  -     ALPRAZolam (XANAX) 0.5 mg tablet; Take 1 tablet (0.5 mg total) by mouth daily at bedtime as needed for anxiety  Pt takes sparingly and is planning to trial Melatonin 5mg daily to assist with sleep   3. Generalized osteoarthritis  Regular exercise encouraged - he usually walks for exercise   4. Hyperlipidemia, unspecified hyperlipidemia type  Low fat diet Labs reviewed and overall stable     6months/awv    Depression Screening and Follow-up Plan: Patient's depression screening was positive with a PHQ-9 score of 10. Patient assessed for underlying major depression. Brief counseling provided and recommend additional follow-up/re-evaluation next office visit.     History of Present Illness     HPI  Pt doing ok The heat has hampered his regular walking program but otherwise no issues NO chest pain or sob He sometimes has challenge sleeping and takes the xanax sparingly as he only gets #15 every 6 months It does help at times He does have days where he feels down but walking has helped and he does not feel speaking to someone will help him No change in hearing and follows with ENT locally No headaches He is trying to stay hydrated He had labs last month for todays visit to review   Review of Systems   Constitutional:  Negative for chills and fever.   HENT:  Positive for hearing loss.    Eyes:  Negative for visual disturbance.   Respiratory:  Negative for cough and shortness of breath.    Cardiovascular:  Negative for chest pain, palpitations and leg swelling.   Gastrointestinal:  Negative for abdominal distention and abdominal pain.   Genitourinary: Negative.     Musculoskeletal:  Positive for arthralgias.   Neurological:  Negative for dizziness, light-headedness and headaches.   Psychiatric/Behavioral:  Negative for sleep disturbance. The patient is not nervous/anxious.      Past Medical History:   Diagnosis Date    Hyperlipidemia     PONV (postoperative nausea and vomiting)      Past Surgical History:   Procedure Laterality Date    ANKLE SURGERY      HI BX PROSTATE STRTCTC SATURATION SAMPLING IMG GID Bilateral 12/5/2022    Procedure: TRANSPERINEAL ULTRASOUND GUIDED BIOPSY PROSTATE;  Surgeon: Jorge Mckeon MD;  Location: [facility];  Service: Urology    WISDOM TOOTH EXTRACTION      WRIST SURGERY       Social History     Socioeconomic History    Marital status: /Civil Union     Spouse name: Not on file    Number of children: Not on file    Years of education: Not on file    Highest education level: Not on file   Occupational History    Not on file   Tobacco Use    Smoking status: Never    Smokeless tobacco: Never    Tobacco comments:     NO TOBACCO USE   Vaping Use    Vaping status: Never Used   Substance and Sexual Activity    Alcohol use: No    Drug use: No    Sexual activity: Yes     Partners: Female   Other Topics Concern    Not on file   Social History Narrative    ALWAYS USES  SEAT BELT    CAFFEINE USE     Social Determinants of Health     Financial Resource Strain: Low Risk  (1/24/2024)    Overall Financial Resource Strain (CARDIA)     Difficulty of Paying Living Expenses: Not very hard   Food Insecurity: Not on file   Transportation Needs: No Transportation Needs (1/24/2024)    PRAPARE - Transportation     Lack of Transportation (Medical): No     Lack of Transportation (Non-Medical): No   Physical Activity: Not on file   Stress: Not on file   Social Connections: Unknown (6/18/2024)    Received from [facility]    Social Connections     How often do you feel lonely or isolated from those around you? (Adult - for ages 18 years and over): Not on file   Intimate  "Partner Violence: Not on file   Housing Stability: Not on file     Allergies   Allergen Reactions    Neomycin-Polymyxin-Hc      rash    Neomycin Rash       Objective     /80   Pulse 97   Temp (!) 97.4 °F (36.3 °C) (Temporal)   Resp 18   Ht 5' 9\" (1.753 m)   Wt 91.2 kg (201 lb)   SpO2 95%   BMI 29.68 kg/m²     Physical Exam  Vitals and nursing note reviewed.   Constitutional:       General: He is not in acute distress.     Appearance: Normal appearance. He is not ill-appearing, toxic-appearing or diaphoretic.   HENT:      Head: Normocephalic and atraumatic.      Right Ear: External ear normal.      Left Ear: External ear normal.      Nose: Nose normal.      Mouth/Throat:      Mouth: Mucous membranes are moist.   Eyes:      General: No scleral icterus.     Extraocular Movements: Extraocular movements intact.      Conjunctiva/sclera: Conjunctivae normal.      Pupils: Pupils are equal, round, and reactive to light.   Cardiovascular:      Rate and Rhythm: Normal rate and regular rhythm.      Pulses: Normal pulses.      Heart sounds: Normal heart sounds. No murmur heard.  Pulmonary:      Effort: Pulmonary effort is normal. No respiratory distress.      Breath sounds: Normal breath sounds. No wheezing.   Abdominal:      General: Bowel sounds are normal. There is no distension.      Palpations: Abdomen is soft.      Tenderness: There is no abdominal tenderness.   Musculoskeletal:      Cervical back: Normal range of motion and neck supple.      Right lower leg: No edema.      Left lower leg: No edema.   Lymphadenopathy:      Cervical: No cervical adenopathy.   Skin:     General: Skin is warm and dry.      Coloration: Skin is not jaundiced or pale.   Neurological:      General: No focal deficit present.      Mental Status: He is alert and oriented to person, place, and time. Mental status is at baseline.      Cranial Nerves: No cranial nerve deficit.   Psychiatric:         Mood and Affect: Mood normal.         " Behavior: Behavior normal.         Thought Content: Thought content normal.         Judgment: Judgment normal.       Administrative Statements

## 2024-08-25 DIAGNOSIS — N40.1 BENIGN PROSTATIC HYPERPLASIA WITH NOCTURIA: ICD-10-CM

## 2024-08-25 DIAGNOSIS — R35.1 BENIGN PROSTATIC HYPERPLASIA WITH NOCTURIA: ICD-10-CM

## 2024-08-26 RX ORDER — TAMSULOSIN HYDROCHLORIDE 0.4 MG/1
CAPSULE ORAL
Qty: 90 CAPSULE | Refills: 1 | Status: SHIPPED | OUTPATIENT
Start: 2024-08-26

## 2024-10-13 DIAGNOSIS — E78.5 HYPERLIPIDEMIA, UNSPECIFIED HYPERLIPIDEMIA TYPE: ICD-10-CM

## 2024-10-15 RX ORDER — SIMVASTATIN 20 MG
TABLET ORAL
Qty: 90 TABLET | Refills: 1 | Status: SHIPPED | OUTPATIENT
Start: 2024-10-15

## 2024-12-16 DIAGNOSIS — R97.20 ELEVATED PSA: ICD-10-CM

## 2024-12-16 DIAGNOSIS — R35.1 BENIGN PROSTATIC HYPERPLASIA WITH NOCTURIA: ICD-10-CM

## 2024-12-16 DIAGNOSIS — N40.1 BENIGN PROSTATIC HYPERPLASIA WITH NOCTURIA: ICD-10-CM

## 2024-12-16 RX ORDER — FINASTERIDE 5 MG/1
5 TABLET, FILM COATED ORAL DAILY
Qty: 90 TABLET | Refills: 1 | Status: SHIPPED | OUTPATIENT
Start: 2024-12-16

## 2025-01-13 ENCOUNTER — APPOINTMENT (OUTPATIENT)
Dept: LAB | Facility: CLINIC | Age: 75
End: 2025-01-13
Payer: MEDICARE

## 2025-01-13 DIAGNOSIS — R97.20 ELEVATED PSA: ICD-10-CM

## 2025-01-13 LAB
PSA FREE MFR SERPL: 17.68 %
PSA FREE SERPL-MCNC: 0.62 NG/ML
PSA SERPL-MCNC: 3.5 NG/ML (ref 0–4)

## 2025-01-13 PROCEDURE — 84154 ASSAY OF PSA FREE: CPT

## 2025-01-13 PROCEDURE — 84153 ASSAY OF PSA TOTAL: CPT

## 2025-01-13 PROCEDURE — 36415 COLL VENOUS BLD VENIPUNCTURE: CPT

## 2025-01-20 NOTE — PROGRESS NOTES
UROLOGY PROGRESS NOTE   [facility]  [address] [place] [address] [place]  [address]  [place] [address]  [phone]  Fax:[phone]  [email or web address]      NAME: Morteza Garcia  AGE: 74 y.o. SEX: male  : 1950   MRN: [id number]    DATE: 2025  TIME: 10:13 AM    Assessment and Plan:    Chronically elevated PSA status post 2 negative biopsies, basically stable and almost 50% drop with finasteride-not interested in getting an MRI.  We will continue to monitor this and recheck it in 6 months.    BPH with obstruction: Doing well with reduced nocturia on the finasteride and the Flomax.  Continue with these indefinitely.               Chief Complaint   No chief complaint on file.      History of Present Illness   Last seen by me 2024-elevated PSA status post negative transperineal prostate biopsy by me 2022.  43 cc gland with PSA density 0.15.  No MRI.  He has had a total of 2 negative biopsies.  PSA dropped roughly about 50% on the finasteride.  Here for follow-up with another PSA.            Component  Ref Range & Units (hover) 25  7:11 AM 24  7:10 AM 23  7:12 AM 23  7:11 AM 22  7:13 AM 21  7:21 AM 18  7:54 AM   PSA, Diagnostic 3.495 3.398 CM 4.0 R, CM 6.74 High  R, CM 6.6 High  R, CM 5.2 High  R, CM 5.5 High  R, CM   Comment: [employer] Access chemiluminescent immunoassay. Confirm baseline values for patients being serially monitored.   PSA, Free 0.618 0.499 0.67 R, CM    1.07 R, CM   PSA % Free 17.682 14.685 16.8 CM    1     BPH with obstruction-on Flomax and finasteride indefinitely.He is ok the way he is, nocturia down to 2x from 5.    Erectile dysfunction: On sildenafil.Not taking anymore. No longer an issue.      The following portions of the patient's history were reviewed and updated as appropriate: allergies, current medications, past family history, past medical history, past social history, past surgical history and problem  "list.  Past Medical History:   Diagnosis Date    Hyperlipidemia     PONV (postoperative nausea and vomiting)      Past Surgical History:   Procedure Laterality Date    ANKLE SURGERY      [place] BX PROSTATE STRTCTC SATURATION SAMPLING IMG GID Bilateral 12/5/2022    Procedure: TRANSPERINEAL ULTRASOUND GUIDED BIOPSY PROSTATE;  Surgeon: Jorge Mckeon MD;  Location: [facility];  Service: Urology    WISDOM TOOTH EXTRACTION      WRIST SURGERY         Review of Systems   Review of Systems    Active Problem List     Patient Active Problem List   Diagnosis    Acoustic neuroma (HCC)    Anxiety    Elevated prostate specific antigen (PSA)    Generalized osteoarthritis    Hyperlipidemia    Reactive depression    Impotence of organic origin       Objective   /88   Pulse 80   Temp 97.8 °F (36.6 °C)   Ht 5' 9\" (1.753 m)   Wt 91.6 kg (202 lb)   SpO2 98%   BMI 29.83 kg/m²     Physical Exam  Vitals reviewed.   Constitutional:       Appearance: Normal appearance.   HENT:      Head: Normocephalic and atraumatic.   Eyes:      Extraocular Movements: Extraocular movements intact.   Pulmonary:      Effort: Pulmonary effort is normal.   Musculoskeletal:         General: Normal range of motion.      Cervical back: Normal range of motion.   Skin:     Coloration: Skin is not jaundiced or pale.   Neurological:      General: No focal deficit present.      Mental Status: He is alert and oriented to person, place, and time. Mental status is at baseline.   Psychiatric:         Mood and Affect: Mood normal.         Behavior: Behavior normal.         Thought Content: Thought content normal.         Judgment: Judgment normal.             Current Medications     Current Outpatient Medications:     ALPRAZolam (XANAX) 0.5 mg tablet, Take 1 tablet (0.5 mg total) by mouth daily at bedtime as needed for anxiety, Disp: 15 tablet, Rfl: 0    Cholecalciferol (VITAMIN D3) 2000 units TABS, Take 1,000 Units by mouth daily, Disp: , Rfl:     finasteride " (PROSCAR) 5 mg tablet, TAKE 1 TABLET (5 MG TOTAL) BY MOUTH DAILY., Disp: 90 tablet, Rfl: 1    Multiple Vitamin (MULTI VITAMIN DAILY PO), Take 1 tablet by mouth daily, Disp: , Rfl:     sertraline (ZOLOFT) 25 mg tablet, TAKE 1 TABLET (25 MG TOTAL) BY MOUTH DAILY., Disp: 90 tablet, Rfl: 3    simvastatin (ZOCOR) 20 mg tablet, TAKE 1 TABLET BY MOUTH EVERY DAY, Disp: 90 tablet, Rfl: 1    tamsulosin (FLOMAX) 0.4 mg, TAKE 1 CAPSULE BY MOUTH EVERY DAY WITH DINNER, Disp: 90 capsule, Rfl: 1        Joreg Mckeon MD

## 2025-01-21 ENCOUNTER — OFFICE VISIT (OUTPATIENT)
Dept: UROLOGY | Facility: CLINIC | Age: 75
End: 2025-01-21
Payer: MEDICARE

## 2025-01-21 VITALS
TEMPERATURE: 97.8 F | HEIGHT: 69 IN | SYSTOLIC BLOOD PRESSURE: 150 MMHG | DIASTOLIC BLOOD PRESSURE: 88 MMHG | OXYGEN SATURATION: 98 % | HEART RATE: 80 BPM | WEIGHT: 202 LBS | BODY MASS INDEX: 29.92 KG/M2

## 2025-01-21 DIAGNOSIS — N40.1 BENIGN PROSTATIC HYPERPLASIA WITH NOCTURIA: ICD-10-CM

## 2025-01-21 DIAGNOSIS — R97.20 ELEVATED PSA: Primary | ICD-10-CM

## 2025-01-21 DIAGNOSIS — R35.1 BENIGN PROSTATIC HYPERPLASIA WITH NOCTURIA: ICD-10-CM

## 2025-01-21 PROCEDURE — 99213 OFFICE O/P EST LOW 20 MIN: CPT | Performed by: UROLOGY

## 2025-02-10 ENCOUNTER — OFFICE VISIT (OUTPATIENT)
Dept: FAMILY MEDICINE CLINIC | Facility: CLINIC | Age: 75
End: 2025-02-10
Payer: MEDICARE

## 2025-02-10 VITALS
BODY MASS INDEX: 29.68 KG/M2 | WEIGHT: 200.4 LBS | OXYGEN SATURATION: 97 % | HEIGHT: 69 IN | TEMPERATURE: 97.1 F | HEART RATE: 87 BPM

## 2025-02-10 DIAGNOSIS — F41.9 ANXIETY: ICD-10-CM

## 2025-02-10 DIAGNOSIS — D33.3 ACOUSTIC NEUROMA (HCC): ICD-10-CM

## 2025-02-10 DIAGNOSIS — E78.5 HYPERLIPIDEMIA, UNSPECIFIED HYPERLIPIDEMIA TYPE: ICD-10-CM

## 2025-02-10 DIAGNOSIS — Z00.00 MEDICARE ANNUAL WELLNESS VISIT, SUBSEQUENT: Primary | ICD-10-CM

## 2025-02-10 DIAGNOSIS — F32.1 MODERATE MAJOR DEPRESSION, SINGLE EPISODE (HCC): ICD-10-CM

## 2025-02-10 PROCEDURE — G0439 PPPS, SUBSEQ VISIT: HCPCS | Performed by: INTERNAL MEDICINE

## 2025-02-10 RX ORDER — ALPRAZOLAM 0.5 MG
0.5 TABLET ORAL
Qty: 15 TABLET | Refills: 0 | Status: SHIPPED | OUTPATIENT
Start: 2025-02-10

## 2025-02-10 NOTE — PROGRESS NOTES
Name: Morteza Garcia      : 1950      MRN: [id number]  Encounter Provider: Zully Torres DO  Encounter Date: 2/10/2025   Encounter department: [facility]    Assessment & Plan  Anxiety    Orders:  •  ALPRAZolam (XANAX) 0.5 mg tablet; Take 1 tablet (0.5 mg total) by mouth daily at bedtime as needed for anxiety  Takes sparingly with benefit   Medicare annual wellness visit, subsequent  Continue regular exercise program Pt doing well and feels better        Moderate major depression, single episode (HCC)  Pt is managing ok and regular exercise does help him          Acoustic neuroma (HCC)  Pt has MRI and specialist followup scheduled for early spring        Hyperlipidemia, unspecified hyperlipidemia type    Orders:  •  Lipid panel; Future  •  Comprehensive metabolic panel; Future  Recheck labs prior to visit 6 months     Depression Screening and Follow-up Plan: Patient was screened for depression during today's encounter. They screened negative with a PHQ-9 score of 0.    Preventive health issues were discussed with patient, and age appropriate screening tests were ordered as noted in patient's After Visit Summary. Personalized health advice and appropriate referrals for health education or preventive services given if needed, as noted in patient's After Visit Summary.    History of Present Illness     HPI   Pt generally well No recent illness He goes to [facility] which is close to his home and walks the indoor track He enjoys that and gets about 5000 steps when he goes No chest pain or sob He saw audiologist and they recommended reeval with MRI and specialist followup which he has scheduled He has not noted any concerning ear issues   Patient Care Team:  Zully Torres DO as PCP - General  DO Jorge Quiros MD (Urology)    Review of Systems   Constitutional:  Negative for chills and fever.   HENT: Negative.     Eyes:  Negative for visual disturbance.   Respiratory:   Negative for cough and shortness of breath.    Cardiovascular: Negative.    Gastrointestinal: Negative.    Genitourinary: Negative.    Musculoskeletal: Negative.    Neurological:  Negative for dizziness, light-headedness and headaches.   Psychiatric/Behavioral:  Negative for sleep disturbance. The patient is not nervous/anxious.      Medical History Reviewed by provider this encounter:  Tobacco  Allergies  Meds  Problems  Med Hx  Surg Hx  Fam Hx       Annual Wellness Visit Questionnaire   Morteza is here for his Subsequent Wellness visit. Last Medicare Wellness visit information reviewed, patient interviewed, no change since last AWV.     Health Risk Assessment:   Patient rates overall health as good. Patient feels that their physical health rating is slightly better. Patient is satisfied with their life. Eyesight was rated as same. Hearing was rated as slightly worse. Patient feels that their emotional and mental health rating is slightly better. Patients states they are never, rarely angry. Patient states they are sometimes unusually tired/fatigued. Pain experienced in the last 7 days has been none. Patient states that he has experienced no weight loss or gain in last 6 months.     Depression Screening:   PHQ-9 Score: 0      Fall Risk Screening:   In the past year, patient has experienced: no history of falling in past year      Home Safety:  Patient does not have trouble with stairs inside or outside of their home. Patient has working smoke alarms and has working carbon monoxide detector. Home safety hazards include: none.     Nutrition:   Current diet is Regular.     Medications:   Patient is currently taking over-the-counter supplements. OTC medications include: see medication list. Patient is able to manage medications.     Activities of Daily Living (ADLs)/Instrumental Activities of Daily Living (IADLs):   Walk and transfer into and out of bed and chair?: Yes  Dress and groom yourself?: Yes    Bathe or  shower yourself?: Yes    Feed yourself? Yes  Do your laundry/housekeeping?: Yes  Manage your money, pay your bills and track your expenses?: Yes  Make your own meals?: Yes    Do your own shopping?: Yes    Previous Hospitalizations:   Any hospitalizations or ED visits within the last 12 months?: No      Advance Care Planning:   Living will: No    Durable POA for healthcare: Yes    Advanced directive: No    End of Life Decisions reviewed with patient: Yes    Provider agrees with end of life decisions: Yes      Cognitive Screening:   Provider or family/friend/caregiver concerned regarding cognition?: No    PREVENTIVE SCREENINGS      Cardiovascular Screening:    General: Screening Not Indicated and History Lipid Disorder      Diabetes Screening:     General: Screening Current      Colorectal Cancer Screening:     General: Screening Current      Prostate Cancer Screening:    General: Screening Current      Osteoporosis Screening:    General: Screening Not Indicated      Abdominal Aortic Aneurysm (AAA) Screening:    Risk factors include: age between 65-74 yo        Lung Cancer Screening:     General: Screening Not Indicated      Hepatitis C Screening:    General: Screening Current    Screening, Brief Intervention, and Referral to Treatment (SBIRT)    Screening  Typical number of drinks in a day: 0  Typical number of drinks in a week: 0  Interpretation: Low risk drinking behavior.    AUDIT-C Screenin) How often did you have a drink containing alcohol in the past year? never  2) How many drinks did you have on a typical day when you were drinking in the past year? 0  3) How often did you have 6 or more drinks on one occasion in the past year? never    AUDIT-C Score: 0  Interpretation: Score 0-3 (male): Negative screen for alcohol misuse    Single Item Drug Screening:  How often have you used an illegal drug (including marijuana) or a prescription medication for non-medical reasons in the past year? never    Single Item  "Drug Screen Score: 0  Interpretation: Negative screen for possible drug use disorder    Brief Intervention  Healthy alcohol use/limits discussed.     Other Counseling Topics:   Calcium and vitamin D intake and regular weightbearing exercise.     Social Drivers of Health     Financial Resource Strain: Low Risk  (1/24/2024)    Overall Financial Resource Strain (CARDIA)    • Difficulty of Paying Living Expenses: Not very hard   Food Insecurity: No Food Insecurity (2/7/2025)    Hunger Vital Sign    • Worried About Running Out of Food in the Last Year: Never true    • Ran Out of Food in the Last Year: Never true   Transportation Needs: No Transportation Needs (2/7/2025)    PRAPARE - Transportation    • Lack of Transportation (Medical): No    • Lack of Transportation (Non-Medical): No   Housing Stability: Low Risk  (2/7/2025)    Housing Stability Vital Sign    • Unable to Pay for Housing in the Last Year: No    • Number of Times Moved in the Last Year: 0    • Homeless in the Last Year: No   Utilities: Not At Risk (2/7/2025)    [facility] Utilities    • Threatened with loss of utilities: No     No results found.    Objective   Pulse 87   Temp (!) 97.1 °F (36.2 °C) (Temporal)   Ht 5' 9\" (1.753 m)   Wt 90.9 kg (200 lb 6.4 oz)   SpO2 97%   BMI 29.59 kg/m²     Physical Exam  Vitals and nursing note reviewed.   Constitutional:       General: He is not in acute distress.     Appearance: Normal appearance. He is not ill-appearing, toxic-appearing or diaphoretic.   HENT:      Head: Normocephalic and atraumatic.      Right Ear: External ear normal.      Left Ear: External ear normal.      Nose: Nose normal.      Mouth/Throat:      Mouth: Mucous membranes are moist.   Cardiovascular:      Rate and Rhythm: Normal rate and regular rhythm.      Pulses: Normal pulses.      Heart sounds: Normal heart sounds. No murmur heard.  Pulmonary:      Effort: Pulmonary effort is normal. No respiratory distress.      Breath sounds: Normal breath " sounds. No wheezing.   Abdominal:      General: Bowel sounds are normal.      Palpations: Abdomen is soft.   Musculoskeletal:      Cervical back: Normal range of motion and neck supple.      Right lower leg: No edema.      Left lower leg: No edema.   Lymphadenopathy:      Cervical: No cervical adenopathy.   Skin:     General: Skin is warm and dry.      Coloration: Skin is not jaundiced or pale.   Neurological:      General: No focal deficit present.      Mental Status: He is alert. Mental status is at baseline.   Psychiatric:         Mood and Affect: Mood normal.         Behavior: Behavior normal.         Thought Content: Thought content normal.         Judgment: Judgment normal.

## 2025-02-10 NOTE — ASSESSMENT & PLAN NOTE
Orders:    Lipid panel; Future    Comprehensive metabolic panel; Future  Recheck labs prior to visit 6 months

## 2025-02-10 NOTE — PATIENT INSTRUCTIONS
Medicare Preventive Visit Patient Instructions  Thank you for completing your Welcome to Medicare Visit or Medicare Annual Wellness Visit today. Your next wellness visit will be due in one year (2/11/2026).  The screening/preventive services that you may require over the next 5-10 years are detailed below. Some tests may not apply to you based off risk factors and/or age. Screening tests ordered at today's visit but not completed yet may show as past due. Also, please note that scanned in results may not display below.  Preventive Screenings:  Service Recommendations Previous Testing/Comments   Colorectal Cancer Screening  Colonoscopy    Fecal Occult Blood Test (FOBT)/Fecal Immunochemical Test (FIT)  Fecal DNA/Cologuard Test  Flexible Sigmoidoscopy Age: 45-75 years old   Colonoscopy: every 10 years (May be performed more frequently if at higher risk)  OR  FOBT/FIT: every 1 year  OR  Cologuard: every 3 years  OR  Sigmoidoscopy: every 5 years  Screening may be recommended earlier than age 45 if at higher risk for colorectal cancer. Also, an individualized decision between you and your healthcare provider will decide whether screening between the ages of 76-85 would be appropriate. Colonoscopy: 11/30/2015  FOBT/FIT: Not on file  Cologuard: Not on file  Sigmoidoscopy: Not on file    Screening Current     Prostate Cancer Screening Individualized decision between patient and health care provider in men between ages of 55-69   Medicare will cover every 12 months beginning on the day after your 50th birthday PSA: 3.495 ng/mL     Screening Current     Hepatitis C Screening Once for adults born between 1945 and 1965  More frequently in patients at high risk for Hepatitis C Hep C Antibody: 07/08/2024    Screening Current   Diabetes Screening 1-2 times per year if you're at risk for diabetes or have pre-diabetes Fasting glucose: 86 mg/dL (7/8/2024)  A1C: No results in last 5 years (No results in last 5 years)  Screening Current    Cholesterol Screening Once every 5 years if you don't have a lipid disorder. May order more often based on risk factors. Lipid panel: 07/08/2024  Screening Not Indicated  History Lipid Disorder      Other Preventive Screenings Covered by Medicare:  Abdominal Aortic Aneurysm (AAA) Screening: covered once if your at risk. You're considered to be at risk if you have a family history of AAA or a male between the age of 65-75 who smoking at least 100 cigarettes in your lifetime.  Lung Cancer Screening: covers low dose CT scan once per year if you meet all of the following conditions: (1) Age 55-77; (2) No signs or symptoms of lung cancer; (3) Current smoker or have quit smoking within the last 15 years; (4) You have a tobacco smoking history of at least 20 pack years (packs per day x number of years you smoked); (5) You get a written order from a healthcare provider.  Glaucoma Screening: covered annually if you're considered high risk: (1) You have diabetes OR (2) Family history of glaucoma OR (3)  aged 50 and older OR (4)  American aged 65 and older  Osteoporosis Screening: covered every 2 years if you meet one of the following conditions: (1) Have a vertebral abnormality; (2) On glucocorticoid therapy for more than 3 months; (3) Have primary hyperparathyroidism; (4) On osteoporosis medications and need to assess response to drug therapy.  HIV Screening: covered annually if you're between the age of 15-65. Also covered annually if you are younger than 15 and older than 65 with risk factors for HIV infection. For pregnant patients, it is covered up to 3 times per pregnancy.    Immunizations:  Immunization Recommendations   Influenza Vaccine Annual influenza vaccination during flu season is recommended for all persons aged >= 6 months who do not have contraindications   Pneumococcal Vaccine   * Pneumococcal conjugate vaccine = PCV13 (Prevnar 13), PCV15 (Vaxneuvance), PCV20 (Prevnar 20)  *  Pneumococcal polysaccharide vaccine = PPSV23 (Pneumovax) Adults 19-63 yo with certain risk factors or if 65+ yo  If never received any pneumonia vaccine: recommend Prevnar 20 (PCV20)  Give PCV20 if previously received 1 dose of PCV13 or PPSV23   Hepatitis B Vaccine 3 dose series if at intermediate or high risk (ex: diabetes, end stage renal disease, liver disease)   Respiratory syncytial virus (RSV) Vaccine - COVERED BY MEDICARE PART D  * RSVPreF3 (Arexvy) CDC recommends that adults 60 years of age and older may receive a single dose of RSV vaccine using shared clinical decision-making (SCDM)   Tetanus (Td) Vaccine - COST NOT COVERED BY MEDICARE PART B Following completion of primary series, a booster dose should be given every 10 years to maintain immunity against tetanus. Td may also be given as tetanus wound prophylaxis.   Tdap Vaccine - COST NOT COVERED BY MEDICARE PART B Recommended at least once for all adults. For pregnant patients, recommended with each pregnancy.   Shingles Vaccine (Shingrix) - COST NOT COVERED BY MEDICARE PART B  2 shot series recommended in those 19 years and older who have or will have weakened immune systems or those 50 years and older     Health Maintenance Due:      Topic Date Due   • Colorectal Cancer Screening  11/30/2025   • Hepatitis C Screening  Completed     Immunizations Due:      Topic Date Due   • Pneumococcal Vaccine: 65+ Years (1 of 1 - PCV) Never done     Advance Directives   What are advance directives?  Advance directives are legal documents that state your wishes and plans for medical care. These plans are made ahead of time in case you lose your ability to make decisions for yourself. Advance directives can apply to any medical decision, such as the treatments you want, and if you want to donate organs.   What are the types of advance directives?  There are many types of advance directives, and each state has rules about how to use them. You may choose a combination of  any of the following:  Living will:  This is a written record of the treatment you want. You can also choose which treatments you do not want, which to limit, and which to stop at a certain time. This includes surgery, medicine, IV fluid, and tube feedings.   Durable power of  for healthcare (DPAHC):  This is a written record that states who you want to make healthcare choices for you when you are unable to make them for yourself. This person, called a proxy, is usually a family member or a friend. You may choose more than 1 proxy.  Do not resuscitate (DNR) order:  A DNR order is used in case your heart stops beating or you stop breathing. It is a request not to have certain forms of treatment, such as CPR. A DNR order may be included in other types of advance directives.  Medical directive:  This covers the care that you want if you are in a coma, near death, or unable to make decisions for yourself. You can list the treatments you want for each condition. Treatment may include pain medicine, surgery, blood transfusions, dialysis, IV or tube feedings, and a ventilator (breathing machine).  Values history:  This document has questions about your views, beliefs, and how you feel and think about life. This information can help others choose the care that you would choose.  Why are advance directives important?  An advance directive helps you control your care. Although spoken wishes may be used, it is better to have your wishes written down. Spoken wishes can be misunderstood, or not followed. Treatments may be given even if you do not want them. An advance directive may make it easier for your family to make difficult choices about your care.   Weight Management   Why it is important to manage your weight:  Being overweight increases your risk of health conditions such as heart disease, high blood pressure, type 2 diabetes, and certain types of cancer. It can also increase your risk for osteoarthritis, sleep  apnea, and other respiratory problems. Aim for a slow, steady weight loss. Even a small amount of weight loss can lower your risk of health problems.  How to lose weight safely:  A safe and healthy way to lose weight is to eat fewer calories and get regular exercise. You can lose up about 1 pound a week by decreasing the number of calories you eat by 500 calories each day.   Healthy meal plan for weight management:  A healthy meal plan includes a variety of foods, contains fewer calories, and helps you stay healthy. A healthy meal plan includes the following:  Eat whole-grain foods more often.  A healthy meal plan should contain fiber. Fiber is the part of grains, fruits, and vegetables that is not broken down by your body. Whole-grain foods are healthy and provide extra fiber in your diet. Some examples of whole-grain foods are whole-wheat breads and pastas, oatmeal, brown rice, and bulgur.  Eat a variety of vegetables every day.  Include dark, leafy greens such as spinach, kale, shabnam greens, and mustard greens. Eat yellow and orange vegetables such as carrots, sweet potatoes, and winter squash.   Eat a variety of fruits every day.  Choose fresh or canned fruit (canned in its own juice or light syrup) instead of juice. Fruit juice has very little or no fiber.  Eat low-fat dairy foods.  Drink fat-free (skim) milk or 1% milk. Eat fat-free yogurt and low-fat cottage cheese. Try low-fat cheeses such as mozzarella and other reduced-fat cheeses.  Choose meat and other protein foods that are low in fat.  Choose beans or other legumes such as split peas or lentils. Choose fish, skinless poultry (chicken or turkey), or lean cuts of red meat (beef or pork). Before you cook meat or poultry, cut off any visible fat.   Use less fat and oil.  Try baking foods instead of frying them. Add less fat, such as margarine, sour cream, regular salad dressing and mayonnaise to foods. Eat fewer high-fat foods. Some examples of high-fat  foods include french fries, doughnuts, ice cream, and cakes.  Eat fewer sweets.  Limit foods and drinks that are high in sugar. This includes candy, cookies, regular soda, and sweetened drinks.  Exercise:  Exercise at least 30 minutes per day on most days of the week. Some examples of exercise include walking, biking, dancing, and swimming. You can also fit in more physical activity by taking the stairs instead of the elevator or parking farther away from stores. Ask your healthcare provider about the best exercise plan for you.      © Copyright [employer] 2018 Information is for End User's use only and may not be sold, redistributed or otherwise used for commercial purposes. All illustrations and images included in CareNotes® are the copyrighted property of [employer] or [employer]

## 2025-02-10 NOTE — ASSESSMENT & PLAN NOTE
Orders:    ALPRAZolam (XANAX) 0.5 mg tablet; Take 1 tablet (0.5 mg total) by mouth daily at bedtime as needed for anxiety  Takes sparingly with benefit

## 2025-03-15 DIAGNOSIS — F41.9 ANXIETY: ICD-10-CM

## 2025-03-16 RX ORDER — SERTRALINE HYDROCHLORIDE 25 MG/1
25 TABLET, FILM COATED ORAL DAILY
Qty: 90 TABLET | Refills: 1 | Status: SHIPPED | OUTPATIENT
Start: 2025-03-16

## 2025-03-18 DIAGNOSIS — R35.1 BENIGN PROSTATIC HYPERPLASIA WITH NOCTURIA: ICD-10-CM

## 2025-03-18 DIAGNOSIS — N40.1 BENIGN PROSTATIC HYPERPLASIA WITH NOCTURIA: ICD-10-CM

## 2025-03-18 RX ORDER — TAMSULOSIN HYDROCHLORIDE 0.4 MG/1
0.4 CAPSULE ORAL
Qty: 90 CAPSULE | Refills: 1 | Status: SHIPPED | OUTPATIENT
Start: 2025-03-18

## 2025-04-24 ENCOUNTER — NURSE TRIAGE (OUTPATIENT)
Age: 75
End: 2025-04-24

## 2025-04-24 NOTE — TELEPHONE ENCOUNTER
"FOLLOW UP: Scheduled to see Dr. Torres Tuesday 4/29/25 at 1300    REASON FOR CONVERSATION: Hypertension    SYMPTOMS: headache, neck tension, /106 at [facility] around 0850 with manual cuff    OTHER: Wife checks pt's BP at home with manual cuff and states his BP is typically 150s/110s, pt does not have medical h/o HTN nor does he take medication for it.     DISPOSITION: See Within 2 Weeks in Office      Reason for Disposition   Systolic BP >= 130 OR Diastolic >= 80, and is not taking BP medications    Answer Assessment - Initial Assessment Questions  1. BLOOD PRESSURE: \"What is your blood pressure?\" \"Did you take at least two measurements 5 minutes apart?\"      166/106 0850; has not had a chance to retake BP as they are out right now.   150s/110s at home    2. ONSET: \"When did you take your blood pressure?\"      See above.    3. HOW: \"How did you take your blood pressure?\" (e.g., automatic home BP monitor, visiting nurse)      Manual BP cuff at fair today manual at home as well    4. HISTORY: \"Do you have a history of high blood pressure?\"      Denies    5. MEDICINES: \"Are you taking any medicines for blood pressure?\" \"Have you missed any doses recently?\"      Denies    6. OTHER SYMPTOMS: \"Do you have any symptoms?\" (e.g., blurred vision, chest pain, difficulty breathing, headache, weakness)      Headache and neck tension    Protocols used: Blood Pressure - High-Adult-OH    "

## 2025-04-24 NOTE — TELEPHONE ENCOUNTER
Please call patient  I can see at lunchtime tomif wants sooner appt  Noon and has to be 30 minute block

## 2025-04-25 ENCOUNTER — OFFICE VISIT (OUTPATIENT)
Dept: FAMILY MEDICINE CLINIC | Facility: CLINIC | Age: 75
End: 2025-04-25
Payer: MEDICARE

## 2025-04-25 ENCOUNTER — TELEPHONE (OUTPATIENT)
Age: 75
End: 2025-04-25

## 2025-04-25 VITALS
TEMPERATURE: 97.8 F | RESPIRATION RATE: 18 BRPM | OXYGEN SATURATION: 96 % | DIASTOLIC BLOOD PRESSURE: 84 MMHG | SYSTOLIC BLOOD PRESSURE: 164 MMHG | BODY MASS INDEX: 30.07 KG/M2 | HEART RATE: 83 BPM | HEIGHT: 69 IN | WEIGHT: 203 LBS

## 2025-04-25 DIAGNOSIS — I10 HYPERTENSION, UNSPECIFIED TYPE: Primary | ICD-10-CM

## 2025-04-25 DIAGNOSIS — F41.9 ANXIETY: ICD-10-CM

## 2025-04-25 PROBLEM — C44.91 BASAL CELL CARCINOMA: Status: ACTIVE | Noted: 2025-04-25

## 2025-04-25 PROBLEM — S62.109A BROKEN WRIST: Status: ACTIVE | Noted: 2025-04-25

## 2025-04-25 PROBLEM — H90.3 BILATERAL SENSORINEURAL HEARING LOSS: Status: ACTIVE | Noted: 2025-04-08

## 2025-04-25 PROCEDURE — 99214 OFFICE O/P EST MOD 30 MIN: CPT | Performed by: INTERNAL MEDICINE

## 2025-04-25 PROCEDURE — G2211 COMPLEX E/M VISIT ADD ON: HCPCS | Performed by: INTERNAL MEDICINE

## 2025-04-25 RX ORDER — HYDROXYZINE HYDROCHLORIDE 10 MG/1
10 TABLET, FILM COATED ORAL 3 TIMES DAILY PRN
Qty: 60 TABLET | Refills: 1 | Status: SHIPPED | OUTPATIENT
Start: 2025-04-25

## 2025-04-25 RX ORDER — LISINOPRIL 5 MG/1
5 TABLET ORAL DAILY
Qty: 90 TABLET | Refills: 3 | Status: SHIPPED | OUTPATIENT
Start: 2025-04-25

## 2025-04-25 NOTE — ASSESSMENT & PLAN NOTE
Orders:    lisinopril (ZESTRIL) 5 mg tablet; Take 1 tablet (5 mg total) by mouth daily  Start lisinopril daily  Monitor BP  Lo sodium diet increase water intake Walking for exercise

## 2025-04-25 NOTE — ASSESSMENT & PLAN NOTE
Orders:    sertraline (ZOLOFT) 50 mg tablet; Take 1 tablet (50 mg total) by mouth daily    hydrOXYzine HCL (ATARAX) 10 mg tablet; Take 1 tablet (10 mg total) by mouth 3 (three) times a day as needed for anxiety  Increase sertraline to 50mg daily Hydroxyzine prn

## 2025-04-25 NOTE — PROGRESS NOTES
"Name: Morteza Garcia      : 1950      MRN: [id number]  Encounter Provider: Zully Torres DO  Encounter Date: 2025   Encounter department: [facility]  :  Assessment & Plan  Hypertension, unspecified type    Orders:  •  lisinopril (ZESTRIL) 5 mg tablet; Take 1 tablet (5 mg total) by mouth daily  Start lisinopril daily  Monitor BP  Lo sodium diet increase water intake Walking for exercise   Anxiety    Orders:  •  sertraline (ZOLOFT) 50 mg tablet; Take 1 tablet (50 mg total) by mouth daily  •  hydrOXYzine HCL (ATARAX) 10 mg tablet; Take 1 tablet (10 mg total) by mouth 3 (three) times a day as needed for anxiety  Increase sertraline to 50mg daily Hydroxyzine prn     Rto 3 weeks     History of Present Illness   HPI  Pt noted to have elevated BP He has been monitoring and it remains higher Pt denies headache, dizziness , chest pain or sob He has not been on meds for bp in past He walks daily for exercise several miles and has not changed schedule recently No recent illness No new meds or supplements No vision changes His wife check bp at home and trend has been mildly above normal range for several weeks with increase recently He has been more anxious and worried He does take sertraline daily and prn alprazolam at hs   Review of Systems   Constitutional:  Negative for activity change, chills and fever.   HENT: Negative.     Eyes:  Negative for visual disturbance.   Respiratory:  Negative for cough and shortness of breath.    Cardiovascular: Negative.  Negative for leg swelling.   Gastrointestinal: Negative.    Genitourinary: Negative.    Musculoskeletal: Negative.    Neurological:  Negative for dizziness, light-headedness, numbness and headaches.   Psychiatric/Behavioral:  Negative for sleep disturbance. The patient is nervous/anxious.        Objective   /84   Pulse 83   Temp 97.8 °F (36.6 °C) (Temporal)   Resp 18   Ht 5' 9\" (1.753 m)   Wt 92.1 kg (203 lb)   SpO2 96%   BMI 29.98 " kg/m²      Physical Exam  Vitals and nursing note reviewed.   Constitutional:       General: He is not in acute distress.     Appearance: Normal appearance. He is not ill-appearing, toxic-appearing or diaphoretic.   HENT:      Head: Normocephalic and atraumatic.      Right Ear: External ear normal.      Left Ear: External ear normal.      Nose: Nose normal.      Mouth/Throat:      Mouth: Mucous membranes are moist.   Eyes:      General: No scleral icterus.     Extraocular Movements: Extraocular movements intact.      Pupils: Pupils are equal, round, and reactive to light.   Cardiovascular:      Rate and Rhythm: Normal rate and regular rhythm.      Pulses: Normal pulses.      Heart sounds: Normal heart sounds.   Pulmonary:      Effort: Pulmonary effort is normal. No respiratory distress.      Breath sounds: Normal breath sounds. No wheezing.   Abdominal:      General: Bowel sounds are normal. There is no distension.      Palpations: Abdomen is soft.      Tenderness: There is no abdominal tenderness.   Musculoskeletal:         General: Normal range of motion.      Cervical back: Normal range of motion and neck supple.      Right lower leg: No edema.      Left lower leg: No edema.   Lymphadenopathy:      Cervical: No cervical adenopathy.   Skin:     General: Skin is warm and dry.      Coloration: Skin is not jaundiced or pale.   Neurological:      General: No focal deficit present.      Mental Status: He is alert and oriented to person, place, and time. Mental status is at baseline.      Cranial Nerves: No cranial nerve deficit.   Psychiatric:         Mood and Affect: Mood normal.         Behavior: Behavior normal.         Thought Content: Thought content normal.         Judgment: Judgment normal.

## 2025-04-25 NOTE — TELEPHONE ENCOUNTER
PA for hydrOXYzine HCl 10MG SUBMITTED t          via    [x][facility]-KEY: [id number]  [x]PA sent as URGENT    All office notes, labs and other pertaining documents and studies sent. Clinical questions answered. Awaiting determination from insurance company.     Turnaround time for your insurance to make a decision on your Prior Authorization can take 7-21 business days.

## 2025-04-28 NOTE — TELEPHONE ENCOUNTER
PA for  hydrOXYzine HCl 10MG  DENIED    Reason:(Screenshot if applicable)        Message sent to office clinical pool Yes    Denial letter scanned into Media Yes    Appeal started No (Provider will need to decide if appeal is warranted and send clinical documentation to Prior Authorization Team for initiation.)    **Please follow up with your patient regarding denial and next steps**

## 2025-04-29 NOTE — TELEPHONE ENCOUNTER
PA for hydrOXYzine HCl 10MG  APPEALED via       [x]Letter sent to insurance via fax [phone]      All necessary records sent. Will await response from insurance company    Turnaround time for a decision to be made on an appeal could take up to 30 business days      APPEAL FORM UPLOADED TO MEDIA

## 2025-04-29 NOTE — TELEPHONE ENCOUNTER
Spoke with pt and wife, they did  rx but just paid the $18 for it. Was wondering how we go about getting it covered through insurance for his next fill tho so he doesn't have to pay again?

## 2025-04-29 NOTE — TELEPHONE ENCOUNTER
The denial reason given is that there is no proof that the pt has tried at least 2 other FDA-approved products for the mgmt of anxiety. I see from pt's chart, he has been on zoloft for a while now. Would this be enough to get hydroxyzine filled under insurance for next refill?

## 2025-04-29 NOTE — TELEPHONE ENCOUNTER
I have not had issue with using the diagnosis of anxiety but every inusrance may be different They may want to check cost with good rx as it may be cheaper to use that if he has a fee with his insurance

## 2025-04-30 NOTE — TELEPHONE ENCOUNTER
PA Appeal for hydrOXYzine HCl 10MG  APPROVED     Date(s) approved       Patient advised by          [x]MyChart Message  []Phone call   []LMOM  []L/M to call office as no active Communication consent on file  []Unable to leave detailed message as VM not approved on Communication consent       Pharmacy advised by    [x]Fax  []Phone call    Message sent to office clinical pool Yes    Approval letter scanned into Media Yes

## 2025-05-06 ENCOUNTER — TELEPHONE (OUTPATIENT)
Age: 75
End: 2025-05-06

## 2025-05-06 NOTE — TELEPHONE ENCOUNTER
Patient's wife, Nahomy, called in to schedule the patient's 6 month follow up appointment with Dr. Mckeon. I reached out to Rachell to overbook an appointment per Nahomy's request to see Dr. Mckeon.

## 2025-05-16 ENCOUNTER — OFFICE VISIT (OUTPATIENT)
Dept: FAMILY MEDICINE CLINIC | Facility: CLINIC | Age: 75
End: 2025-05-16
Payer: MEDICARE

## 2025-05-16 VITALS
BODY MASS INDEX: 29.71 KG/M2 | RESPIRATION RATE: 18 BRPM | SYSTOLIC BLOOD PRESSURE: 148 MMHG | DIASTOLIC BLOOD PRESSURE: 88 MMHG | TEMPERATURE: 97.4 F | OXYGEN SATURATION: 94 % | HEART RATE: 80 BPM | WEIGHT: 200.6 LBS | HEIGHT: 69 IN

## 2025-05-16 DIAGNOSIS — R97.20 ELEVATED PSA: ICD-10-CM

## 2025-05-16 DIAGNOSIS — E78.5 HYPERLIPIDEMIA, UNSPECIFIED HYPERLIPIDEMIA TYPE: ICD-10-CM

## 2025-05-16 DIAGNOSIS — I10 HYPERTENSION, UNSPECIFIED TYPE: Primary | ICD-10-CM

## 2025-05-16 DIAGNOSIS — F41.9 ANXIETY: ICD-10-CM

## 2025-05-16 DIAGNOSIS — N40.1 BENIGN PROSTATIC HYPERPLASIA WITH NOCTURIA: ICD-10-CM

## 2025-05-16 DIAGNOSIS — R35.1 BENIGN PROSTATIC HYPERPLASIA WITH NOCTURIA: ICD-10-CM

## 2025-05-16 PROCEDURE — G2211 COMPLEX E/M VISIT ADD ON: HCPCS | Performed by: INTERNAL MEDICINE

## 2025-05-16 PROCEDURE — 99214 OFFICE O/P EST MOD 30 MIN: CPT | Performed by: INTERNAL MEDICINE

## 2025-05-16 RX ORDER — SIMVASTATIN 20 MG
20 TABLET ORAL DAILY
Qty: 90 TABLET | Refills: 1 | Status: SHIPPED | OUTPATIENT
Start: 2025-05-16

## 2025-05-16 RX ORDER — HYDROXYZINE HYDROCHLORIDE 10 MG/1
10 TABLET, FILM COATED ORAL 3 TIMES DAILY PRN
Qty: 60 TABLET | Refills: 1 | Status: SHIPPED | OUTPATIENT
Start: 2025-05-16

## 2025-05-16 RX ORDER — LISINOPRIL 10 MG/1
10 TABLET ORAL DAILY
Qty: 100 TABLET | Refills: 3 | Status: SHIPPED | OUTPATIENT
Start: 2025-05-16

## 2025-05-16 NOTE — PROGRESS NOTES
Name: Morteza Garcia      : 1950      MRN: [id number]  Encounter Provider: Zully Torres DO  Encounter Date: 2025   Encounter department: [facility]  :  Assessment & Plan  Hypertension, unspecified type    Orders:  •  lisinopril (ZESTRIL) 10 mg tablet; Take 1 tablet (10 mg total) by mouth daily  Increase lisinopril to 10mg daily   Lo sodium diet Stay hydrated  Regular walking program   Monitor BP and record    F/u 1 month          History of Present Illness   HPI  Pt is feeling better since on Lisinopril He denies headaches and feels less tired His anxiety has lessened No chest pain or sob His Bp trend has been lower but not normal range as of yet No side effects from bp med and takes daily He is walking daily at [facility]   Review of Systems   Constitutional:  Negative for activity change, appetite change, chills and fever.   HENT: Negative.     Eyes:  Negative for visual disturbance.   Respiratory:  Negative for cough and shortness of breath.    Cardiovascular: Negative.    Gastrointestinal: Negative.    Genitourinary: Negative.    Neurological:  Negative for dizziness, light-headedness and headaches.   Psychiatric/Behavioral:  Negative for sleep disturbance. The patient is not nervous/anxious.      Past Medical History:   Diagnosis Date   • Hyperlipidemia    • PONV (postoperative nausea and vomiting)      Past Surgical History:   Procedure Laterality Date   • ANKLE SURGERY     • NE BX PROSTATE STRTCTC SATURATION SAMPLING IMG GID Bilateral 2022    Procedure: TRANSPERINEAL ULTRASOUND GUIDED BIOPSY PROSTATE;  Surgeon: Jorge Mckeon MD;  Location: [facility];  Service: Urology   • WISDOM TOOTH EXTRACTION     • WRIST SURGERY       Social History     Socioeconomic History   • Marital status: /Civil Union     Spouse name: Not on file   • Number of children: Not on file   • Years of education: Not on file   • Highest education level: Not on file   Occupational History   •  "Not on file   Tobacco Use   • Smoking status: Never   • Smokeless tobacco: Never   • Tobacco comments:     NO TOBACCO USE   Vaping Use   • Vaping status: Never Used   Substance and Sexual Activity   • Alcohol use: No   • Drug use: No   • Sexual activity: Yes     Partners: Female   Other Topics Concern   • Not on file   Social History Narrative    ALWAYS USES  SEAT BELT    CAFFEINE USE     Social Drivers of Health     Financial Resource Strain: Low Risk  (1/24/2024)    Overall Financial Resource Strain (CARDIA)    • Difficulty of Paying Living Expenses: Not very hard   Food Insecurity: No Food Insecurity (2/7/2025)    Nursing - Inadequate Food Risk Classification    • Worried About Running Out of Food in the Last Year: Never true    • Ran Out of Food in the Last Year: Never true    • Ran Out of Food in the Last Year: Not on file   Transportation Needs: No Transportation Needs (2/7/2025)    PRAPARE - Transportation    • Lack of Transportation (Medical): No    • Lack of Transportation (Non-Medical): No   Physical Activity: Not on file   Stress: Not on file   Social Connections: Unknown (6/18/2024)    Received from [facility]    Social Connections    • How often do you feel lonely or isolated from those around you? (Adult - for ages 18 years and over): Not on file   Intimate Partner Violence: Not on file   Housing Stability: Low Risk  (2/7/2025)    Housing Stability Vital Sign    • Unable to Pay for Housing in the Last Year: No    • Number of Times Moved in the Last Year: 0    • Homeless in the Last Year: No     Allergies   Allergen Reactions   • Neomycin-Polymyxin-Hc      rash   • Neomycin Rash       Objective   /88   Pulse 80   Temp (!) 97.4 °F (36.3 °C) (Temporal)   Resp 18   Ht 5' 9\" (1.753 m)   Wt 91 kg (200 lb 9.6 oz)   SpO2 94%   BMI 29.62 kg/m²      Physical Exam  Vitals and nursing note reviewed.   Constitutional:       General: He is not in acute distress.     Appearance: Normal appearance. He is " not ill-appearing, toxic-appearing or diaphoretic.   HENT:      Head: Normocephalic and atraumatic.      Right Ear: External ear normal.      Left Ear: External ear normal.      Nose: Nose normal.      Mouth/Throat:      Mouth: Mucous membranes are moist.     Cardiovascular:      Rate and Rhythm: Normal rate and regular rhythm.      Pulses: Normal pulses.   Pulmonary:      Effort: Pulmonary effort is normal. No respiratory distress.      Breath sounds: Normal breath sounds. No wheezing.     Musculoskeletal:      Cervical back: Normal range of motion.      Right lower leg: No edema.      Left lower leg: No edema.     Skin:     Findings: No rash.     Neurological:      General: No focal deficit present.      Mental Status: He is alert and oriented to person, place, and time. Mental status is at baseline.      Cranial Nerves: No cranial nerve deficit.

## 2025-05-16 NOTE — ASSESSMENT & PLAN NOTE
Orders:    lisinopril (ZESTRIL) 10 mg tablet; Take 1 tablet (10 mg total) by mouth daily  Increase lisinopril to 10mg daily   Lo sodium diet Stay hydrated  Regular walking program   Monitor BP and record    F/u 1 month

## 2025-05-19 RX ORDER — FINASTERIDE 5 MG/1
5 TABLET, FILM COATED ORAL DAILY
Qty: 90 TABLET | Refills: 1 | Status: SHIPPED | OUTPATIENT
Start: 2025-05-19

## 2025-06-25 ENCOUNTER — OFFICE VISIT (OUTPATIENT)
Dept: FAMILY MEDICINE CLINIC | Facility: CLINIC | Age: 75
End: 2025-06-25
Payer: MEDICARE

## 2025-06-25 VITALS
OXYGEN SATURATION: 92 % | SYSTOLIC BLOOD PRESSURE: 136 MMHG | TEMPERATURE: 96 F | RESPIRATION RATE: 18 BRPM | BODY MASS INDEX: 29.39 KG/M2 | DIASTOLIC BLOOD PRESSURE: 78 MMHG | HEART RATE: 85 BPM | WEIGHT: 199 LBS

## 2025-06-25 DIAGNOSIS — F41.9 ANXIETY: ICD-10-CM

## 2025-06-25 DIAGNOSIS — I10 HYPERTENSION, UNSPECIFIED TYPE: Primary | ICD-10-CM

## 2025-06-25 PROCEDURE — G2211 COMPLEX E/M VISIT ADD ON: HCPCS | Performed by: INTERNAL MEDICINE

## 2025-06-25 PROCEDURE — 99214 OFFICE O/P EST MOD 30 MIN: CPT | Performed by: INTERNAL MEDICINE

## 2025-06-25 NOTE — PROGRESS NOTES
Name: Morteza Garcia      : 1950      MRN: [id number]  Encounter Provider: Zully Torres DO  Encounter Date: 2025   Encounter department: [facility]  :  Assessment & Plan  Hypertension, unspecified type  BP trend improving on increased Lisinopril Continue current rx Continue regular exercise regimen, stay hydrated       Anxiety  Sxs managed on current rx and pt does exercise regularly which also is helpful diversion activity        Rto in August as scheduled        History of Present Illness   HPI  Pt feels well and BP trend is lower since increasing Lisinopril He is tolerating rx well He continues to exercise daily indoors at [facility] and feels well while walking No chest pain or dizziness He is less anxious as well and is pleased BP is improving He is staying hydrated with the recent warm temps and has AC at home He would like ears checked for wax since he just cleaned them  Review of Systems   Constitutional:  Negative for chills and fever.   HENT: Negative.     Eyes:  Negative for visual disturbance.   Respiratory:  Negative for cough and shortness of breath.    Cardiovascular: Negative.    Gastrointestinal: Negative.    Genitourinary: Negative.    Musculoskeletal: Negative.    Neurological: Negative.    Psychiatric/Behavioral: Negative.         Objective   /78   Pulse 85   Temp (!) 96 °F (35.6 °C)   Resp 18   Wt 90.3 kg (199 lb)   SpO2 92%   BMI 29.39 kg/m²      Physical Exam  Vitals and nursing note reviewed.   Constitutional:       General: He is not in acute distress.     Appearance: Normal appearance. He is not ill-appearing, toxic-appearing or diaphoretic.   HENT:      Head: Normocephalic and atraumatic.      Right Ear: External ear normal.      Left Ear: External ear normal.      Nose: Nose normal.      Mouth/Throat:      Mouth: Mucous membranes are moist.     Eyes:      General: No scleral icterus.     Extraocular Movements: Extraocular movements intact.       Conjunctiva/sclera: Conjunctivae normal.      Pupils: Pupils are equal, round, and reactive to light.       Cardiovascular:      Rate and Rhythm: Normal rate and regular rhythm.      Pulses: Normal pulses.      Heart sounds: Normal heart sounds. No murmur heard.  Pulmonary:      Effort: Pulmonary effort is normal. No respiratory distress.      Breath sounds: Normal breath sounds. No wheezing.   Abdominal:      General: There is no distension.      Palpations: Abdomen is soft.      Tenderness: There is no abdominal tenderness.     Musculoskeletal:      Cervical back: Normal range of motion and neck supple.      Right lower leg: No edema.      Left lower leg: No edema.   Lymphadenopathy:      Cervical: No cervical adenopathy.     Skin:     General: Skin is warm and dry.      Coloration: Skin is not jaundiced or pale.     Neurological:      General: No focal deficit present.      Mental Status: He is alert and oriented to person, place, and time. Mental status is at baseline.      Cranial Nerves: No cranial nerve deficit.     Psychiatric:         Mood and Affect: Mood normal.         Behavior: Behavior normal.

## 2025-06-26 NOTE — ASSESSMENT & PLAN NOTE
BP trend improving on increased Lisinopril Continue current rx Continue regular exercise regimen, stay hydrated

## 2025-06-26 NOTE — ASSESSMENT & PLAN NOTE
Sxs managed on current rx and pt does exercise regularly which also is helpful diversion activity

## 2025-08-05 ENCOUNTER — APPOINTMENT (OUTPATIENT)
Dept: LAB | Facility: CLINIC | Age: 75
End: 2025-08-05
Payer: MEDICARE

## 2025-08-05 DIAGNOSIS — R97.20 ELEVATED PSA: ICD-10-CM

## 2025-08-05 DIAGNOSIS — E78.5 HYPERLIPIDEMIA, UNSPECIFIED HYPERLIPIDEMIA TYPE: ICD-10-CM

## 2025-08-05 LAB
ALBUMIN SERPL BCG-MCNC: 4 G/DL (ref 3.5–5)
ALP SERPL-CCNC: 60 U/L (ref 34–104)
ALT SERPL W P-5'-P-CCNC: 19 U/L (ref 7–52)
ANION GAP SERPL CALCULATED.3IONS-SCNC: 10 MMOL/L (ref 4–13)
AST SERPL W P-5'-P-CCNC: 17 U/L (ref 13–39)
BILIRUB SERPL-MCNC: 0.66 MG/DL (ref 0.2–1)
BUN SERPL-MCNC: 16 MG/DL (ref 5–25)
CALCIUM SERPL-MCNC: 9.3 MG/DL (ref 8.4–10.2)
CHLORIDE SERPL-SCNC: 106 MMOL/L (ref 96–108)
CHOLEST SERPL-MCNC: 197 MG/DL (ref ?–200)
CO2 SERPL-SCNC: 24 MMOL/L (ref 21–32)
CREAT SERPL-MCNC: 0.77 MG/DL (ref 0.6–1.3)
GFR SERPL CREATININE-BSD FRML MDRD: 90 ML/MIN/1.73SQ M
GLUCOSE P FAST SERPL-MCNC: 91 MG/DL (ref 65–99)
HDLC SERPL-MCNC: 57 MG/DL
LDLC SERPL CALC-MCNC: 115 MG/DL (ref 0–100)
NONHDLC SERPL-MCNC: 140 MG/DL
POTASSIUM SERPL-SCNC: 4.4 MMOL/L (ref 3.5–5.3)
PROT SERPL-MCNC: 6.8 G/DL (ref 6.4–8.4)
PSA SERPL-MCNC: 3.14 NG/ML (ref 0–4)
SODIUM SERPL-SCNC: 140 MMOL/L (ref 135–147)
TRIGL SERPL-MCNC: 125 MG/DL (ref ?–150)

## 2025-08-05 PROCEDURE — 36415 COLL VENOUS BLD VENIPUNCTURE: CPT

## 2025-08-05 PROCEDURE — 80061 LIPID PANEL: CPT

## 2025-08-05 PROCEDURE — 80053 COMPREHEN METABOLIC PANEL: CPT

## 2025-08-05 PROCEDURE — 84153 ASSAY OF PSA TOTAL: CPT

## 2025-08-12 ENCOUNTER — OFFICE VISIT (OUTPATIENT)
Age: 75
End: 2025-08-12
Payer: MEDICARE

## 2025-08-13 ENCOUNTER — OFFICE VISIT (OUTPATIENT)
Dept: FAMILY MEDICINE CLINIC | Facility: CLINIC | Age: 75
End: 2025-08-13
Payer: MEDICARE

## (undated) DEVICE — FORMALIN PRE-FILLED 10 PCT NEUTRAL 20ML

## (undated) DEVICE — CHLORHEXIDINE 4PCT 4 OZ

## (undated) DEVICE — SYRINGE CATH TIP 50ML

## (undated) DEVICE — SPONGE LAP 18 X 18 IN

## (undated) DEVICE — 1820 FOAM BLOCK NEEDLE COUNTER: Brand: DEVON

## (undated) DEVICE — COBAN 1 IN UNSTERILE

## (undated) DEVICE — NEEDLE SPINAL 22G X 3.5IN  QUINCKE

## (undated) DEVICE — 3M™ TEGADERM™ TRANSPARENT FILM DRESSING FRAME STYLE, 1628, 6 IN X 8 IN (15 CM X 20 CM), 10/CT 8CT/CASE: Brand: 3M™ TEGADERM™

## (undated) DEVICE — ASTOUND STANDARD SURGICAL GOWN, XL: Brand: CONVERTORS

## (undated) DEVICE — UTILITY MARKER,BLACK WITH LABELS: Brand: DEVON

## (undated) DEVICE — LIGHT GLOVE GREEN

## (undated) DEVICE — NEEDLE 18 G X 1 1/2

## (undated) DEVICE — GLOVE SRG BIOGEL 7

## (undated) DEVICE — TELFA NON-ADHERENT ABSORBENT DRESSING: Brand: TELFA

## (undated) DEVICE — MAX-CORE® DISPOSABLE CORE BIOPSY INSTRUMENT, 18G X 25CM: Brand: MAX-CORE

## (undated) DEVICE — HEAVY DUTY TABLE COVER: Brand: CONVERTORS

## (undated) DEVICE — SYRINGE 10ML LL

## (undated) DEVICE — ULTRASOUND GEL STERILE FOIL PK

## (undated) DEVICE — CHLORAPREP HI-LITE 26ML ORANGE

## (undated) DEVICE — COVER PROBE ECLIPSE